# Patient Record
Sex: MALE | Race: BLACK OR AFRICAN AMERICAN | NOT HISPANIC OR LATINO | URBAN - METROPOLITAN AREA
[De-identification: names, ages, dates, MRNs, and addresses within clinical notes are randomized per-mention and may not be internally consistent; named-entity substitution may affect disease eponyms.]

---

## 2017-11-13 ENCOUNTER — INPATIENT (INPATIENT)
Facility: HOSPITAL | Age: 62
LOS: 2 days | Discharge: HOME | DRG: 191 | End: 2017-11-16
Attending: INTERNAL MEDICINE | Admitting: HOSPITALIST
Payer: COMMERCIAL

## 2017-11-13 VITALS
DIASTOLIC BLOOD PRESSURE: 115 MMHG | SYSTOLIC BLOOD PRESSURE: 163 MMHG | WEIGHT: 242.51 LBS | HEIGHT: 70 IN | OXYGEN SATURATION: 100 % | HEART RATE: 85 BPM

## 2017-11-13 DIAGNOSIS — R06.02 SHORTNESS OF BREATH: ICD-10-CM

## 2017-11-13 LAB
ALBUMIN SERPL ELPH-MCNC: 3.1 G/DL — LOW (ref 3.3–5)
ALP SERPL-CCNC: 206 U/L — HIGH (ref 40–120)
ALT FLD-CCNC: 49 U/L — SIGNIFICANT CHANGE UP (ref 12–78)
ANION GAP SERPL CALC-SCNC: 8 MMOL/L — SIGNIFICANT CHANGE UP (ref 5–17)
ANISOCYTOSIS BLD QL: SLIGHT — SIGNIFICANT CHANGE UP
APTT BLD: 27.4 SEC — LOW (ref 27.5–37.4)
AST SERPL-CCNC: 52 U/L — HIGH (ref 15–37)
BILIRUB SERPL-MCNC: 1 MG/DL — SIGNIFICANT CHANGE UP (ref 0.2–1.2)
BUN SERPL-MCNC: 21 MG/DL — SIGNIFICANT CHANGE UP (ref 7–23)
CALCIUM SERPL-MCNC: 7.9 MG/DL — LOW (ref 8.5–10.1)
CHLORIDE SERPL-SCNC: 104 MMOL/L — SIGNIFICANT CHANGE UP (ref 96–108)
CK MB BLD-MCNC: 0.3 % — SIGNIFICANT CHANGE UP (ref 0–3.5)
CK MB CFR SERPL CALC: 4.7 NG/ML — HIGH (ref 0–3.6)
CK SERPL-CCNC: 1583 U/L — HIGH (ref 26–308)
CO2 SERPL-SCNC: 30 MMOL/L — SIGNIFICANT CHANGE UP (ref 22–31)
CREAT SERPL-MCNC: 1.3 MG/DL — SIGNIFICANT CHANGE UP (ref 0.5–1.3)
D DIMER BLD IA.RAPID-MCNC: 382 NG/ML DDU — HIGH
ELLIPTOCYTES BLD QL SMEAR: SLIGHT — SIGNIFICANT CHANGE UP
EOSINOPHIL NFR BLD AUTO: 3 % — SIGNIFICANT CHANGE UP (ref 0–6)
GLUCOSE SERPL-MCNC: 110 MG/DL — HIGH (ref 70–99)
HCT VFR BLD CALC: 51.6 % — HIGH (ref 39–50)
HGB BLD-MCNC: 15.2 G/DL — SIGNIFICANT CHANGE UP (ref 13–17)
INR BLD: 1.17 RATIO — HIGH (ref 0.88–1.16)
LACTATE SERPL-SCNC: 1.3 MMOL/L — SIGNIFICANT CHANGE UP (ref 0.7–2)
LG PLATELETS BLD QL AUTO: SLIGHT — SIGNIFICANT CHANGE UP
LYMPHOCYTES # BLD AUTO: 29 % — SIGNIFICANT CHANGE UP (ref 13–44)
MCHC RBC-ENTMCNC: 22.6 PG — LOW (ref 27–34)
MCHC RBC-ENTMCNC: 29.5 GM/DL — LOW (ref 32–36)
MCV RBC AUTO: 76.6 FL — LOW (ref 80–100)
MONOCYTES NFR BLD AUTO: 10 % — HIGH (ref 1–9)
NEUTROPHILS NFR BLD AUTO: 54 % — SIGNIFICANT CHANGE UP (ref 43–77)
NEUTS BAND # BLD: 1 % — SIGNIFICANT CHANGE UP (ref 0–8)
NT-PROBNP SERPL-SCNC: 3858 PG/ML — HIGH (ref 0–125)
OVALOCYTES BLD QL SMEAR: SLIGHT — SIGNIFICANT CHANGE UP
PLAT MORPH BLD: NORMAL — SIGNIFICANT CHANGE UP
PLATELET # BLD AUTO: 160 K/UL — SIGNIFICANT CHANGE UP (ref 150–400)
POIKILOCYTOSIS BLD QL AUTO: SLIGHT — SIGNIFICANT CHANGE UP
POTASSIUM SERPL-MCNC: 3.3 MMOL/L — LOW (ref 3.5–5.3)
POTASSIUM SERPL-SCNC: 3.3 MMOL/L — LOW (ref 3.5–5.3)
PROT SERPL-MCNC: 7.3 G/DL — SIGNIFICANT CHANGE UP (ref 6–8.3)
PROTHROM AB SERPL-ACNC: 12.8 SEC — HIGH (ref 9.8–12.7)
RBC # BLD: 6.73 M/UL — HIGH (ref 4.2–5.8)
RBC # FLD: 15.4 % — HIGH (ref 10.3–14.5)
RBC BLD AUTO: ABNORMAL
SODIUM SERPL-SCNC: 142 MMOL/L — SIGNIFICANT CHANGE UP (ref 135–145)
TROPONIN I SERPL-MCNC: 0.12 NG/ML — HIGH (ref 0.01–0.04)
VARIANT LYMPHS # BLD: 3 % — SIGNIFICANT CHANGE UP (ref 0–6)
WBC # BLD: 7.3 K/UL — SIGNIFICANT CHANGE UP (ref 3.8–10.5)
WBC # FLD AUTO: 7.3 K/UL — SIGNIFICANT CHANGE UP (ref 3.8–10.5)

## 2017-11-13 PROCEDURE — 93010 ELECTROCARDIOGRAM REPORT: CPT

## 2017-11-13 PROCEDURE — 99285 EMERGENCY DEPT VISIT HI MDM: CPT

## 2017-11-13 PROCEDURE — 71020: CPT | Mod: 26

## 2017-11-13 PROCEDURE — 71275 CT ANGIOGRAPHY CHEST: CPT | Mod: 26

## 2017-11-13 RX ORDER — AZITHROMYCIN 500 MG/1
TABLET, FILM COATED ORAL
Qty: 0 | Refills: 0 | Status: DISCONTINUED | OUTPATIENT
Start: 2017-11-13 | End: 2017-11-14

## 2017-11-13 RX ORDER — AZITHROMYCIN 500 MG/1
500 TABLET, FILM COATED ORAL ONCE
Qty: 0 | Refills: 0 | Status: COMPLETED | OUTPATIENT
Start: 2017-11-13 | End: 2017-11-13

## 2017-11-13 RX ORDER — METOPROLOL TARTRATE 50 MG
25 TABLET ORAL ONCE
Qty: 0 | Refills: 0 | Status: COMPLETED | OUTPATIENT
Start: 2017-11-13 | End: 2017-11-13

## 2017-11-13 RX ORDER — BUDESONIDE, MICRONIZED 100 %
0.5 POWDER (GRAM) MISCELLANEOUS
Qty: 0 | Refills: 0 | Status: DISCONTINUED | OUTPATIENT
Start: 2017-11-13 | End: 2017-11-16

## 2017-11-13 RX ORDER — CLOPIDOGREL BISULFATE 75 MG/1
300 TABLET, FILM COATED ORAL ONCE
Qty: 0 | Refills: 0 | Status: COMPLETED | OUTPATIENT
Start: 2017-11-13 | End: 2017-11-13

## 2017-11-13 RX ORDER — ENOXAPARIN SODIUM 100 MG/ML
110 INJECTION SUBCUTANEOUS ONCE
Qty: 0 | Refills: 0 | Status: COMPLETED | OUTPATIENT
Start: 2017-11-13 | End: 2017-11-13

## 2017-11-13 RX ORDER — IPRATROPIUM/ALBUTEROL SULFATE 18-103MCG
3 AEROSOL WITH ADAPTER (GRAM) INHALATION EVERY 6 HOURS
Qty: 0 | Refills: 0 | Status: DISCONTINUED | OUTPATIENT
Start: 2017-11-13 | End: 2017-11-16

## 2017-11-13 RX ORDER — METOPROLOL TARTRATE 50 MG
2.5 TABLET ORAL EVERY 4 HOURS
Qty: 0 | Refills: 0 | Status: DISCONTINUED | OUTPATIENT
Start: 2017-11-13 | End: 2017-11-14

## 2017-11-13 RX ORDER — AZITHROMYCIN 500 MG/1
500 TABLET, FILM COATED ORAL EVERY 24 HOURS
Qty: 0 | Refills: 0 | Status: DISCONTINUED | OUTPATIENT
Start: 2017-11-14 | End: 2017-11-14

## 2017-11-13 RX ORDER — SODIUM CHLORIDE 9 MG/ML
1000 INJECTION INTRAMUSCULAR; INTRAVENOUS; SUBCUTANEOUS
Qty: 0 | Refills: 0 | Status: COMPLETED | OUTPATIENT
Start: 2017-11-13 | End: 2017-11-13

## 2017-11-13 RX ORDER — ISOSORBIDE DINITRATE 5 MG/1
10 TABLET ORAL THREE TIMES A DAY
Qty: 0 | Refills: 0 | Status: DISCONTINUED | OUTPATIENT
Start: 2017-11-13 | End: 2017-11-14

## 2017-11-13 RX ORDER — ASPIRIN/CALCIUM CARB/MAGNESIUM 324 MG
325 TABLET ORAL ONCE
Qty: 0 | Refills: 0 | Status: COMPLETED | OUTPATIENT
Start: 2017-11-13 | End: 2017-11-13

## 2017-11-13 RX ADMIN — SODIUM CHLORIDE 1000 MILLILITER(S): 9 INJECTION INTRAMUSCULAR; INTRAVENOUS; SUBCUTANEOUS at 23:45

## 2017-11-13 RX ADMIN — Medication 325 MILLIGRAM(S): at 21:32

## 2017-11-13 RX ADMIN — SODIUM CHLORIDE 1000 MILLILITER(S): 9 INJECTION INTRAMUSCULAR; INTRAVENOUS; SUBCUTANEOUS at 20:21

## 2017-11-13 RX ADMIN — CLOPIDOGREL BISULFATE 300 MILLIGRAM(S): 75 TABLET, FILM COATED ORAL at 21:32

## 2017-11-13 RX ADMIN — ENOXAPARIN SODIUM 110 MILLIGRAM(S): 100 INJECTION SUBCUTANEOUS at 23:46

## 2017-11-13 RX ADMIN — AZITHROMYCIN 255 MILLIGRAM(S): 500 TABLET, FILM COATED ORAL at 23:46

## 2017-11-13 RX ADMIN — Medication 25 MILLIGRAM(S): at 21:32

## 2017-11-13 NOTE — CONSULT NOTE ADULT - SUBJECTIVE AND OBJECTIVE BOX
Chart reviewed: Assessment plan is as below Note will be updated as more  patient data is gathered  Patient seen and examined today Plan discussed/Questions answered  (with patient/ancillary staff/colleagues) Chart notes reviewd More detailed Pulm/Critical Care notes, assessment and plan  will be added later today as needed after reviewing further labs as they become available     Notable interval events reviewed    ROS/PE  .     REVIEW OF SYMPTOMS    Able to give ROS  Yes     RELIABLE YES   Weakness Yes   Chills No   Vision changes No  Lymph nodes No enlarged glands   Endocrine No unexplained hair loss No het or cold intolerance   Allergy No hives   Sore throat No   Coughing blood No  Headache No  Confusion No  Chest pain No  Palpitations No   Pain abdomen NO   Shortness of breath YES  Vomiting NO  Pain neck No  Pain abdomen NO     PHYSICAL EXAM    HEENT Unremarkable PERRLA atraumatic  RESP Fair air entry EXP prolonged    Harsh breath sound Resp distres mild  CARDIAC S1 S2 No S3     NO JVD   ABDOMEN SOFT BS PRESENT NOT DISTENDED No hepatosplenomegaly  PEDAL EDEMA present No calf tenderness  NO rash GENERAL Not TOXIC looking  Awake A & O x 3   .     ERIC SANCHEZ [facility]    ALLERGY Amoxicillin cipro lovastat pencillin   CONTACT Auntie Vega [phone] self [phone]   REASON FOR VISIT   Initial evaluation/Pulmonary consultation requested 11/13/2017 by Dr Rory monterroso from Dr Nunn    Patient examined chart reviewed  HOSPITAL ADMISSION [facility] Dr Rory monterroso 11/13/2017    PATIENT CAME  FROM (if information available)    PAST MEDICAL & SURGICAL HISTORY:   Past Medical History:  Gout    High cholesterol    HTN (hypertension).     Tobacco Usage:  · Tobacco Usage Current every day smoker    ALLERGIES AND HOME MEDICATIONS:   Allergies:        Allergies:   lovastatin: Drug, Unknown   Cipro: Drug, Unknown   penicillin: Drug, Unknown   amoxicillin: Drug, Unknown  VITALS/LABS  11/13/2017 163/115 85 100% 110   11/13/2017 W 7.3 Hb 15.2 Plt 160  Na 142 K 3.3 CO2 30 Cr 1.3   11/13/2017 Ddimer 382   11/13/2017  AST 52 ALT 49   11/13/2017 CK 1583 Tr 1 .120   11/13/2017 CXR CM no infiltrates    PROBLEM ASSESSMENT PLAN                                                                                                 RESP DISTRESS  O2  RO VTE  11/13 Check V duplex legs  11/13 CTA already ordered   11/13/2017 Cl susp is mod to low   RO PNEUMONIA  11/13 RVP   11/13 bc uc   11/13 Azithro (11/13)  RO MI HYPERTENSIVE URGENCY  11/13/2017 CK 1583 Tr 1 .120   Isordil (11/13) Lopressor iv (11/13)  11/13 May need ntg drip and icu admission   COPD EX   BD (11/13)  RO CHF   ECHO 11/13                                                                                                                PATIENT DESCRIPTION HPI INITIAL PROBLEMS AND PLAN11/13/2017 ADMISSION [facility]                                  61 m PMH Htn HLD Gout current daily smoker all lovastatin cipro pnc amox PMH Parotiditis  sciatica bph sue erectile dysfn hld obesity   MEDS lisinopril 20 tamsulosin .4 atorvastat 10   Patient  had been having chest congestion and upper resp symptoms x last 3-4 d and in office was noted to have  D 104 and was sent to [facility] ER r hypertensive urgency ro poneumonia admitted 11/13/2017 Pt given clonidine .2 in [facility] office and given duoneb.2 transit                                                                                  HOSPITAL COURSE PLAN  11/13/2017 ADMISSION [facility]     Pt had elevated BP and elevated C enz suggesting hypertensive urgency  He will be ruled out for vte as DDimer was sl high and will be started on abio for poss rti      TIME SPENT Over 55 minutes aggregate  care time spent on encounter; activities included   direct patient care, counseling and/or coordinating care reviewing notes, lab data/ imaging , discussion with multidisciplinary team/ patient  /family. Risks, benefits, alternatives  discussed in detail. Proper antibiotic use issues addressed Questions/concerns  were addressed  as  best as possible

## 2017-11-13 NOTE — ED ADULT TRIAGE NOTE - CHIEF COMPLAINT QUOTE
cough w/ sore throat for 3 days, SOB today, given 0.1mg of clonidine at drDustin office for HTN and 2 duoneb treatments with EMS.

## 2017-11-13 NOTE — ED PROVIDER NOTE - OBJECTIVE STATEMENT
62 yo M p/w cough, congestion , dyspnea x past ~ 4 - 5 days. No known fever/chills. No chest pain. Pt seen by urgent care, sent to ed for HTN. NO HA/n/v/dizzy. No visual changes. No neck / back pain. no recent travel / immov. No recent sick contacts. No abd pain. No recent JUAREZ / easy fatigue. no agg/allev factors .

## 2017-11-13 NOTE — ED ADULT NURSE NOTE - OBJECTIVE STATEMENT
61 year old male presents to the ED with c/o cough and hypertension. A+O x 4. States he went to the doctor today for "flu-like s/s and was ashleigh his blood pressure was elevated and he should go to the hospital." Pt reports he has had a productive cough and nasal/ chest congestion for 4 days. afebrile. Denies headache, fever, syncope, LOC, CP, palpitations, abdominal pain, or back pain. Reports increased sob and worsening cough. Respirations even and unlabored on room air. Rhonchi heard bilaterally. + BS in all quadrants. Abdomen soft, non tender, and non distended. No swelling/ edema noted. Ambulates without incidence. Skin free of pallor/ cyanosis. S1/S2. Will continue to monitor.

## 2017-11-13 NOTE — ED PROVIDER NOTE - PROGRESS NOTE DETAILS
Dw Dr CHRISTIANO Nunn, pts PMD, states can use Hosp to admit, call SNEHA Bynum cardio. Jeremías Bynum, re elev CE. admit at [facility], he will see pt tonight. Jeremías Perez, will see pt to admit.

## 2017-11-13 NOTE — ED ADULT NURSE NOTE - CHPI ED SYMPTOMS NEG
no vomiting/no numbness/no decreased eating/drinking/no fever/no dizziness/no pain/no chills/no weakness/no nausea/no tingling

## 2017-11-14 DIAGNOSIS — M10.9 GOUT, UNSPECIFIED: ICD-10-CM

## 2017-11-14 DIAGNOSIS — E78.00 PURE HYPERCHOLESTEROLEMIA, UNSPECIFIED: ICD-10-CM

## 2017-11-14 DIAGNOSIS — Z29.9 ENCOUNTER FOR PROPHYLACTIC MEASURES, UNSPECIFIED: ICD-10-CM

## 2017-11-14 DIAGNOSIS — I10 ESSENTIAL (PRIMARY) HYPERTENSION: ICD-10-CM

## 2017-11-14 DIAGNOSIS — Z98.890 OTHER SPECIFIED POSTPROCEDURAL STATES: Chronic | ICD-10-CM

## 2017-11-14 DIAGNOSIS — R06.02 SHORTNESS OF BREATH: ICD-10-CM

## 2017-11-14 DIAGNOSIS — I16.0 HYPERTENSIVE URGENCY: ICD-10-CM

## 2017-11-14 LAB
ALBUMIN SERPL ELPH-MCNC: 2.6 G/DL — LOW (ref 3.3–5)
ALP SERPL-CCNC: 166 U/L — HIGH (ref 40–120)
ALT FLD-CCNC: 45 U/L — SIGNIFICANT CHANGE UP (ref 12–78)
ANION GAP SERPL CALC-SCNC: 9 MMOL/L — SIGNIFICANT CHANGE UP (ref 5–17)
APPEARANCE UR: CLEAR — SIGNIFICANT CHANGE UP
AST SERPL-CCNC: 49 U/L — HIGH (ref 15–37)
BACTERIA # UR AUTO: ABNORMAL
BASOPHILS # BLD AUTO: 0.3 K/UL — HIGH (ref 0–0.2)
BASOPHILS NFR BLD AUTO: 3.9 % — HIGH (ref 0–2)
BILIRUB DIRECT SERPL-MCNC: 0.2 MG/DL — SIGNIFICANT CHANGE UP (ref 0.05–0.2)
BILIRUB INDIRECT FLD-MCNC: 0.4 MG/DL — SIGNIFICANT CHANGE UP (ref 0.2–1)
BILIRUB SERPL-MCNC: 0.6 MG/DL — SIGNIFICANT CHANGE UP (ref 0.2–1.2)
BILIRUB UR-MCNC: NEGATIVE — SIGNIFICANT CHANGE UP
BUN SERPL-MCNC: 25 MG/DL — HIGH (ref 7–23)
CALCIUM SERPL-MCNC: 7.3 MG/DL — LOW (ref 8.5–10.1)
CHLORIDE SERPL-SCNC: 110 MMOL/L — HIGH (ref 96–108)
CK SERPL-CCNC: 1043 U/L — HIGH (ref 26–308)
CO2 SERPL-SCNC: 25 MMOL/L — SIGNIFICANT CHANGE UP (ref 22–31)
COLOR SPEC: YELLOW — SIGNIFICANT CHANGE UP
CREAT SERPL-MCNC: 1.2 MG/DL — SIGNIFICANT CHANGE UP (ref 0.5–1.3)
DIFF PNL FLD: ABNORMAL
EOSINOPHIL # BLD AUTO: 0.2 K/UL — SIGNIFICANT CHANGE UP (ref 0–0.5)
EOSINOPHIL NFR BLD AUTO: 1 % — SIGNIFICANT CHANGE UP (ref 0–6)
GLUCOSE SERPL-MCNC: 139 MG/DL — HIGH (ref 70–99)
GLUCOSE UR QL: NEGATIVE — SIGNIFICANT CHANGE UP
HCT VFR BLD CALC: 43.7 % — SIGNIFICANT CHANGE UP (ref 39–50)
HGB BLD-MCNC: 13.1 G/DL — SIGNIFICANT CHANGE UP (ref 13–17)
INR BLD: 1.26 RATIO — HIGH (ref 0.88–1.16)
KETONES UR-MCNC: NEGATIVE — SIGNIFICANT CHANGE UP
LEUKOCYTE ESTERASE UR-ACNC: NEGATIVE — SIGNIFICANT CHANGE UP
LYMPHOCYTES # BLD AUTO: 1.9 K/UL — SIGNIFICANT CHANGE UP (ref 1–3.3)
LYMPHOCYTES # BLD AUTO: 28 % — SIGNIFICANT CHANGE UP (ref 13–44)
MAGNESIUM SERPL-MCNC: 1.4 MG/DL — LOW (ref 1.6–2.6)
MCHC RBC-ENTMCNC: 22.9 PG — LOW (ref 27–34)
MCHC RBC-ENTMCNC: 30.1 GM/DL — LOW (ref 32–36)
MCV RBC AUTO: 76.1 FL — LOW (ref 80–100)
MONOCYTES # BLD AUTO: 0.9 K/UL — SIGNIFICANT CHANGE UP (ref 0–0.9)
MONOCYTES NFR BLD AUTO: 17 % — HIGH (ref 1–9)
NEUTROPHILS # BLD AUTO: 3.2 K/UL — SIGNIFICANT CHANGE UP (ref 1.8–7.4)
NEUTROPHILS NFR BLD AUTO: 49 % — SIGNIFICANT CHANGE UP (ref 43–77)
NITRITE UR-MCNC: NEGATIVE — SIGNIFICANT CHANGE UP
PH UR: 7 — SIGNIFICANT CHANGE UP (ref 5–8)
PHOSPHATE SERPL-MCNC: 3.5 MG/DL — SIGNIFICANT CHANGE UP (ref 2.5–4.5)
PLATELET # BLD AUTO: 142 K/UL — LOW (ref 150–400)
POTASSIUM SERPL-MCNC: 3.4 MMOL/L — LOW (ref 3.5–5.3)
POTASSIUM SERPL-SCNC: 3.4 MMOL/L — LOW (ref 3.5–5.3)
PROT SERPL-MCNC: 5.9 G/DL — LOW (ref 6–8.3)
PROT UR-MCNC: 150 MG/DL
PROTHROM AB SERPL-ACNC: 13.8 SEC — HIGH (ref 9.8–12.7)
RAPID RVP RESULT: DETECTED
RBC # BLD: 5.74 M/UL — SIGNIFICANT CHANGE UP (ref 4.2–5.8)
RBC # FLD: 15.2 % — HIGH (ref 10.3–14.5)
RBC CASTS # UR COMP ASSIST: ABNORMAL /HPF (ref 0–4)
RSV RNA SPEC QL NAA+PROBE: DETECTED
SODIUM SERPL-SCNC: 144 MMOL/L — SIGNIFICANT CHANGE UP (ref 135–145)
SP GR SPEC: 1 — LOW (ref 1.01–1.02)
TROPONIN I SERPL-MCNC: 0.11 NG/ML — HIGH (ref 0.01–0.04)
UROBILINOGEN FLD QL: 1
WBC # BLD: 6.4 K/UL — SIGNIFICANT CHANGE UP (ref 3.8–10.5)
WBC # FLD AUTO: 6.4 K/UL — SIGNIFICANT CHANGE UP (ref 3.8–10.5)
WBC UR QL: SIGNIFICANT CHANGE UP

## 2017-11-14 PROCEDURE — 99223 1ST HOSP IP/OBS HIGH 75: CPT | Mod: AI,GC

## 2017-11-14 PROCEDURE — 93970 EXTREMITY STUDY: CPT | Mod: 26

## 2017-11-14 PROCEDURE — 12345: CPT | Mod: NC

## 2017-11-14 RX ORDER — METOPROLOL TARTRATE 50 MG
25 TABLET ORAL
Qty: 0 | Refills: 0 | Status: DISCONTINUED | OUTPATIENT
Start: 2017-11-14 | End: 2017-11-16

## 2017-11-14 RX ORDER — ENOXAPARIN SODIUM 100 MG/ML
40 INJECTION SUBCUTANEOUS EVERY 24 HOURS
Qty: 0 | Refills: 0 | Status: DISCONTINUED | OUTPATIENT
Start: 2017-11-14 | End: 2017-11-16

## 2017-11-14 RX ORDER — TAMSULOSIN HYDROCHLORIDE 0.4 MG/1
1 CAPSULE ORAL
Qty: 0 | Refills: 0 | COMMUNITY

## 2017-11-14 RX ORDER — HYDRALAZINE HCL 50 MG
5 TABLET ORAL EVERY 4 HOURS
Qty: 0 | Refills: 0 | Status: DISCONTINUED | OUTPATIENT
Start: 2017-11-14 | End: 2017-11-16

## 2017-11-14 RX ORDER — DEXTROSE MONOHYDRATE, SODIUM CHLORIDE, AND POTASSIUM CHLORIDE 50; .745; 4.5 G/1000ML; G/1000ML; G/1000ML
1000 INJECTION, SOLUTION INTRAVENOUS
Qty: 0 | Refills: 0 | Status: DISCONTINUED | OUTPATIENT
Start: 2017-11-14 | End: 2017-11-16

## 2017-11-14 RX ORDER — POTASSIUM CHLORIDE 20 MEQ
40 PACKET (EA) ORAL ONCE
Qty: 0 | Refills: 0 | Status: COMPLETED | OUTPATIENT
Start: 2017-11-14 | End: 2017-11-14

## 2017-11-14 RX ORDER — LISINOPRIL 2.5 MG/1
20 TABLET ORAL DAILY
Qty: 0 | Refills: 0 | Status: DISCONTINUED | OUTPATIENT
Start: 2017-11-14 | End: 2017-11-14

## 2017-11-14 RX ORDER — ASPIRIN/CALCIUM CARB/MAGNESIUM 324 MG
81 TABLET ORAL DAILY
Qty: 0 | Refills: 0 | Status: DISCONTINUED | OUTPATIENT
Start: 2017-11-14 | End: 2017-11-16

## 2017-11-14 RX ORDER — LISINOPRIL 2.5 MG/1
40 TABLET ORAL DAILY
Qty: 0 | Refills: 0 | Status: DISCONTINUED | OUTPATIENT
Start: 2017-11-14 | End: 2017-11-16

## 2017-11-14 RX ORDER — CICLOPIROX OLAMINE 7.7 MG/G
0 CREAM TOPICAL
Qty: 0 | Refills: 0 | COMMUNITY

## 2017-11-14 RX ORDER — AZITHROMYCIN 500 MG/1
500 TABLET, FILM COATED ORAL EVERY 24 HOURS
Qty: 0 | Refills: 0 | Status: DISCONTINUED | OUTPATIENT
Start: 2017-11-14 | End: 2017-11-16

## 2017-11-14 RX ORDER — POTASSIUM CHLORIDE 20 MEQ
40 PACKET (EA) ORAL ONCE
Qty: 0 | Refills: 0 | Status: DISCONTINUED | OUTPATIENT
Start: 2017-11-14 | End: 2017-11-14

## 2017-11-14 RX ORDER — LANOLIN ALCOHOL/MO/W.PET/CERES
3 CREAM (GRAM) TOPICAL ONCE
Qty: 0 | Refills: 0 | Status: COMPLETED | OUTPATIENT
Start: 2017-11-14 | End: 2017-11-15

## 2017-11-14 RX ORDER — COLCHICINE 0.6 MG
1 TABLET ORAL
Qty: 0 | Refills: 0 | COMMUNITY

## 2017-11-14 RX ORDER — AMLODIPINE BESYLATE 2.5 MG/1
10 TABLET ORAL DAILY
Qty: 0 | Refills: 0 | Status: DISCONTINUED | OUTPATIENT
Start: 2017-11-14 | End: 2017-11-16

## 2017-11-14 RX ORDER — MAGNESIUM SULFATE 500 MG/ML
1 VIAL (ML) INJECTION ONCE
Qty: 0 | Refills: 0 | Status: COMPLETED | OUTPATIENT
Start: 2017-11-14 | End: 2017-11-14

## 2017-11-14 RX ORDER — COLCHICINE 0.6 MG
0.6 TABLET ORAL DAILY
Qty: 0 | Refills: 0 | Status: DISCONTINUED | OUTPATIENT
Start: 2017-11-14 | End: 2017-11-16

## 2017-11-14 RX ORDER — AMLODIPINE BESYLATE 2.5 MG/1
10 TABLET ORAL ONCE
Qty: 0 | Refills: 0 | Status: COMPLETED | OUTPATIENT
Start: 2017-11-14 | End: 2017-11-14

## 2017-11-14 RX ORDER — ATORVASTATIN CALCIUM 80 MG/1
10 TABLET, FILM COATED ORAL AT BEDTIME
Qty: 0 | Refills: 0 | Status: DISCONTINUED | OUTPATIENT
Start: 2017-11-14 | End: 2017-11-16

## 2017-11-14 RX ORDER — LISINOPRIL 2.5 MG/1
0 TABLET ORAL
Qty: 0 | Refills: 0 | COMMUNITY

## 2017-11-14 RX ADMIN — Medication 100 GRAM(S): at 18:01

## 2017-11-14 RX ADMIN — Medication 81 MILLIGRAM(S): at 12:47

## 2017-11-14 RX ADMIN — AZITHROMYCIN 500 MILLIGRAM(S): 500 TABLET, FILM COATED ORAL at 22:09

## 2017-11-14 RX ADMIN — ISOSORBIDE DINITRATE 10 MILLIGRAM(S): 5 TABLET ORAL at 06:24

## 2017-11-14 RX ADMIN — Medication 0.6 MILLIGRAM(S): at 12:47

## 2017-11-14 RX ADMIN — Medication 2.5 MILLIGRAM(S): at 02:25

## 2017-11-14 RX ADMIN — Medication 3 MILLILITER(S): at 07:36

## 2017-11-14 RX ADMIN — LISINOPRIL 40 MILLIGRAM(S): 2.5 TABLET ORAL at 12:47

## 2017-11-14 RX ADMIN — Medication 2.5 MILLIGRAM(S): at 06:25

## 2017-11-14 RX ADMIN — Medication 3 MILLILITER(S): at 13:58

## 2017-11-14 RX ADMIN — DEXTROSE MONOHYDRATE, SODIUM CHLORIDE, AND POTASSIUM CHLORIDE 50 MILLILITER(S): 50; .745; 4.5 INJECTION, SOLUTION INTRAVENOUS at 19:17

## 2017-11-14 RX ADMIN — Medication 3 MILLILITER(S): at 20:18

## 2017-11-14 RX ADMIN — LISINOPRIL 20 MILLIGRAM(S): 2.5 TABLET ORAL at 06:24

## 2017-11-14 RX ADMIN — Medication 0.5 MILLIGRAM(S): at 07:35

## 2017-11-14 RX ADMIN — ATORVASTATIN CALCIUM 10 MILLIGRAM(S): 80 TABLET, FILM COATED ORAL at 22:09

## 2017-11-14 RX ADMIN — ENOXAPARIN SODIUM 40 MILLIGRAM(S): 100 INJECTION SUBCUTANEOUS at 22:08

## 2017-11-14 RX ADMIN — Medication 25 MILLIGRAM(S): at 18:01

## 2017-11-14 RX ADMIN — Medication 3 MILLILITER(S): at 01:40

## 2017-11-14 RX ADMIN — Medication 40 MILLIEQUIVALENT(S): at 02:25

## 2017-11-14 RX ADMIN — Medication 0.5 MILLIGRAM(S): at 20:18

## 2017-11-14 RX ADMIN — AMLODIPINE BESYLATE 10 MILLIGRAM(S): 2.5 TABLET ORAL at 22:09

## 2017-11-14 NOTE — PROGRESS NOTE ADULT - SUBJECTIVE AND OBJECTIVE BOX
Patient seen and examined today Plan discussed/Questions answered  (with patient/ancillary staff/colleagues) Chart notes reviewd More detailed Pulm/Critical Care notes, assessment and plan  will be added later today as needed after reviewing further labs as they become available     Notable interval events reviewed    ROS/PE  . REVIEW OF SYMPTOMS    Able to give ROS  Yes     RELIABLE YES   Weakness Yes   Chills No   Vision changes No  Lymph nodes No enlarged glands   Endocrine No unexplained hair loss No het or cold intolerance   Allergy No hives   Sore throat No   Coughing blood No  Headache No  Confusion No  Chest pain No  Palpitations No   Pain abdomen NO   Shortness of breath YES  Vomiting NO  Pain neck No  Pain abdomen NO     PHYSICAL EXAM    HEENT Unremarkable PERRLA atraumatic  RESP Fair air entry EXP prolonged    Harsh breath sound Resp distres mild  CARDIAC S1 S2 No S3     NO JVD   ABDOMEN SOFT BS PRESENT NOT DISTENDED No hepatosplenomegaly  PEDAL EDEMA present No calf tenderness  NO rash GENERAL Not TOXIC looking  Awake A & O x 3   . BRIEF ASSESSMENT PLAN   hypertensive with elevated cardiac enzymes and resp tract rsv infection on supportive Rx and antihypertensives Befing followed by cardio for positive enzymes  For dc planning once bp controlled and cleared by cardio Patient seen and examined today Plan discussed/Questions answered  (with patient/ancillary staff/colleagues) Chart notes reviewd More detailed Pulm/Critical Care notes, assessment and plan  will be added later today as needed after reviewing further labs as they become available     Notable interval events reviewed    ROS/PE  . REVIEW OF SYMPTOMS    Able to give ROS  Yes     RELIABLE YES   Weakness Yes   Chills No   Vision changes No  Lymph nodes No enlarged glands   Endocrine No unexplained hair loss No het or cold intolerance   Allergy No hives   Sore throat No   Coughing blood No  Headache No  Confusion No  Chest pain No  Palpitations No   Pain abdomen NO   Shortness of breath YES  Vomiting NO  Pain neck No  Pain abdomen NO     PHYSICAL EXAM    HEENT Unremarkable PERRLA atraumatic  RESP Fair air entry EXP prolonged    Harsh breath sound Resp distres mild  CARDIAC S1 S2 No S3     NO JVD   ABDOMEN SOFT BS PRESENT NOT DISTENDED No hepatosplenomegaly  PEDAL EDEMA present No calf tenderness  NO rash GENERAL Not TOXIC looking  Awake A & O x 3   . BRIEF ASSESSMENT PLAN   hypertensive with elevated cardiac enzymes and resp tract rsv infection on supportive Rx and antihypertensives Befing followed by cardio for positive enzymes  For dc planning once bp controlled and cleared by cardio    PAST MEDICAL & SURGICAL HISTORY:   Past Medical History:  Gout    High cholesterol    HTN (hypertension).     Tobacco Usage:  · Tobacco Usage Current every day smoker    ALLERGIES AND HOME MEDICATIONS:   Allergies:        Allergies:   lovastatin: Drug, Unknown   Cipro: Drug, Unknown   penicillin: Drug, Unknown   amoxicillin: Drug, Unknown  VITALS/LABS  11/14/2017 afeb 84 145/98 18 99%   11/14/2017 D5 1/2 NS 20 K 50 (11/14)   11/14/2017 W 6.4 Hb 13.1 Plt 142  Na 144 K 3.4 CO2 25 Cr 1.2 G 139  11/14 Mg 1.4 (d)   PATIENT DESCRIPTION HPI INITIAL PROBLEMS AND PLAN11/13/2017 ADMISSION [facility]                                  61 m PMH Htn HLD Gout current daily smoker all lovastatin cipro pnc amox PMH Parotiditis  sciatica bph sue erectile dysfn hld obesity   MEDS lisinopril 20 tamsulosin .4 atorvastat 10   Patient  had been having chest congestion and upper resp symptoms x last 3-4 d and in office was noted to have  D 104 and was sent to [facility] ER r hypertensive urgency ro poneumonia admitted 11/13/2017 Pt given clonidine .2 in [facility] office and given duoneb.2 transit                                                                                  HOSPITAL COURSE PLAN  11/13/2017 ADMISSION [facility]     Pt had elevated BP and elevated C enz suggesting hypertensive urgency  Hyoertension was controlled using ACEI Hydralazine and BB He was ruled out for vte as DDimer was sl high on arrival and was found to have RSV RTI and was Rxed with supp care plus azithro for poss superimposed bact infection Alk Phos was elevated and reich was started      PROBLEM ASSESSMENT PLAN                                                                                                 RESP DISTRESS  Managed with suppl O2   VTE   11/13 Ruled out Neg CTA and Neg V Duplex   11/13/2017 Cl susp was  mod to low   RO PNEUMONIA  11/13 RVP showed RSV  On 11/13 pct  was .12   Rxed with Azithro (11/13) for poss superimposed bact infectn   COPD   Managed with IBD ICS   RO MI  Initial C enz were positive   11/13-11/14/2017 CK 8109-5590 Tr 1 .120 (i) - .108 (i)  FD lvnx started 11/13 was changed to prophylactic dose 11/14 by PMD   Managed with nitrates statins BB acei   On  11/14 Cardio Dr FLOWER felt that C enz do not meet criteria for NSTEMI  HYPERTENSIVE URGENCY  Managed with Hydralazine 5 v.6p (11/14)  Lisinopril 40 (11/14) Metoprolol 25.2 (11/14)   RO CHF   ECHO  ordered on 11/13   ELEVATED ALK PHOS   Check US ruq and GGT and ac hep panel (ordered 11/14)   ADRENAL MASS   CTACh 11/13  3.1 cm R adr mass with low attenuation cw adenoma To be FU as outpt  TIME SPENT Over 25 minutes aggregate critical care time spent on encounter; activities included   direct patient care, counseling and/or coordinating care reviewing notes, lab data/ imaging , discussion with multidisciplinary team/ patient  /family. Risks, benefits, alternatives  discussed in detail. Questions/concerns  were addressed  to the best of my ability .

## 2017-11-14 NOTE — H&P ADULT - PROBLEM SELECTOR PLAN 5
- dvt ppx with full dose lovenox   - PO diet - dvt ppx with lovenox   - PO diet - continue colchicine

## 2017-11-14 NOTE — H&P ADULT - NSHPREVIEWOFSYSTEMS_GEN_ALL_CORE
Constitutional: denies fever, chills, diaphoresis   HEENT: denies blurry vision, difficulty hearing  Respiratory: +SOB, +JUAREZ, +cough, +sputum production, +wheezing, denies hemoptysis  Cardiovascular: denies CP, palpitations, +edema  Gastrointestinal: denies nausea, vomiting, diarrhea, constipation, abdominal pain, melena, hematochezia   Genitourinary: denies dysuria, frequency, urgency, hematuria   Musculoskeletal: denies myalgias, joint swelling, muscle weakness  Neurologic: denies headache, weakness, dizziness, paresthesias, numbness/tingling  ROS negative except as noted above

## 2017-11-14 NOTE — H&P ADULT - ASSESSMENT
Patient is a 60 yo male with pmhx of HTN, HLD, Gout presenting with shortness of breath, admit for respiratory distress, r/o CHF exacerbation vs copd vs PE Patient is a 62 yo male with pmhx of HTN, HLD, Gout presenting with shortness of breath, admit for respiratory distress, evaluate for CHF exacerbation vs copd vs PE, hypertensive urgency evaluate for ACS.

## 2017-11-14 NOTE — PROGRESS NOTE ADULT - ATTENDING COMMENTS
Patient is a 60 yo male with pmhx of HTN, HLD, Gout presenting with shortness of breath, admitted for respiratory distress,  2/2 elevated troponins with elevated BP sec to  Hypertensive Urgency cont bp meds , high bnp  xray cardiomegaly but no congestion  and clinical examination euvolemia will fu echo result to r/o  cardiomyopathy cont asa   , hx leg bl swelling pt does used to take lasix  but state no pmh hx chf . hypokalemia replaced - fu electrolyte .  Right-sided adrenal adenoma measuring 3.1 cm./ fu serum acth than out pt fu.  from 11-15-17 pt will be on Dr carlson service care  .

## 2017-11-14 NOTE — H&P ADULT - NSHPPHYSICALEXAM_GEN_ALL_CORE
Physical Exam:  General: Well developed, well nourished, NAD  HEENT: NCAT, PERRLA, EOMI bl, moist mucous membranes   Neck: Supple, nontender, no mass  Neurology: A&Ox3, nonfocal  Respiratory: diffused wheezing b/l, +crackles b/l   CV: RRR, +S1/S2, no murmurs, rubs or gallops  Abdominal: Soft, NT, ND +BSx4  Extremities: +1 b/l LE edema  Skin: warm, dry

## 2017-11-14 NOTE — H&P ADULT - PROBLEM SELECTOR PLAN 1
- r/o CHF vs COPD vs PE  - continue full dose lovenox, aspirin 81  - f/u CTA result  - f/u LE doppler result  - trend CE  - f/u cardiac echo  - strict I&Os, daily weight  - continue dusamy pulmicort  - Pulm Consult Dr. Nunn  - Cardio consult [facility] - r/o CHF vs COPD vs PE  - continue aspirin 81  - continue zithromax  - f/u CTA result  - f/u LE doppler result  - trend CE  - f/u cardiac echo  - strict I&Os, daily weight  - continue duoneb, pulmicort  - Pulm Consult Dr. Nunn  - Cardio consult [facility] - evaluate for  new onset CHF vs COPD exacerbation vs PE  - continue aspirin 81  - continue zithromax  - f/u CTA result  - f/u LE doppler result  - trend CE  - f/u cardiac echo  - strict I&Os, daily weight  - continue duoneb, pulmicort  - Pulm Consult Dr. Nunn  - Cardio consult [facility]

## 2017-11-14 NOTE — H&P ADULT - PROBLEM SELECTOR PLAN 3
- continue atorvastatin monitor BP  ICU was consulted  evaluate for ACS, trend troponins monitor BP  improved  evaluate for ACS, trend troponins

## 2017-11-14 NOTE — CONSULT NOTE ADULT - SUBJECTIVE AND OBJECTIVE BOX
[facility]     Patient is a 61y old  Male who presents with a chief complaint of     HPI:  Patient is a 62 yo male with pmhx of HTN, HLD, Gout presenting with shortness of breath. Patient stated that he has cold like symptoms with chest congestion and cough for about 3-4 days, went to his PMD today for evaluation and was told that he has elevated blood pressure and was sent to the ED. Patient also complain of SOB for over 1 year which worsened in the past few days. Patient complains of JUAREZ, whenever he walks long distance or up stairs. Stated that he sleeps on about 5 pillows every night. Patient also admits to occasional b/l leg swelling. Stated that his chest pain started about 3-4 days ago and worsens whenever he coughs. Denies any sick contact, stated that he received his flu shot about 2 months ago. Patient denies any hx of CHF, or heart disease. Denies fever, chills, palpitations, abdominal pain, nausea, vomiting, diarrhea, constipation, urinary frequency, urgency, or dysuria, headaches, changes in vision, dizziness, numbness, or tingling.    In the ED, is afebrile, 75bpm, 182/108, RR16 @97% room air. WBC 7.3, h/h 15.2/51.6. K 3.3, BUN 21, Cr 1.3. Lactate 1.3, Ddimer 382, alk phos 206, AST 52, CK 1583, CKMB 4.7, Troponin 0.12, ProBNP 3858. CXR was negative for infiltrates or effusion. EKG showed sinus rhythm, RBBB, left anterior fascicular block, bifascular block, left ventricular hypertrophy. (2017 00:05)      HPI:    61y Male for Cardiology Consult    PAST MEDICAL & SURGICAL HISTORY:  High cholesterol  HTN (hypertension)  Gout  H/O elbow surgery  History of hip surgery      FAMILY HISTORY:  Family history of heart disease (Father)      SOCIAL HISTORY:   Alcohol:  Smoking:    Allergies    amoxicillin (Unknown)  Cipro (Unknown)  lovastatin (Unknown)  penicillin (Unknown)    Intolerances        MEDICATIONS  (STANDING):  ALBUTerol/ipratropium for Nebulization 3 milliLiter(s) Nebulizer every 6 hours  aspirin  chewable 81 milliGRAM(s) Oral daily  atorvastatin 10 milliGRAM(s) Oral at bedtime  azithromycin  IVPB      azithromycin  IVPB 500 milliGRAM(s) IV Intermittent every 24 hours  buDESOnide   0.5 milliGRAM(s) Respule 0.5 milliGRAM(s) Inhalation two times a day  colchicine 0.6 milliGRAM(s) Oral daily  enoxaparin Injectable 40 milliGRAM(s) SubCutaneous every 24 hours  guaiFENesin/dextromethorphan  Syrup 10 milliLiter(s) Oral four times a day  isosorbide   dinitrate Tablet (ISORDIL) 10 milliGRAM(s) Oral three times a day  lisinopril 40 milliGRAM(s) Oral daily    MEDICATIONS  (PRN):      REVIEW OF SYSTEMS:  CONSTITUTIONAL: No fever, weight loss, chills, shakes, or fat  RESPIRATORY: No cough, wheezing, hemoptysis, or shortness of breath  CARDIOVASCULAR: No chest pain, dyspnea, palpitations, dizziness, syncope, paroxysmal nocturnal dyspnea, orthopnea, or arm or leg swelling  GASTROINTESTINAL: No abdominal  or epigastric pain, nausea, vomiting, hematemesis, diarrhea, constipation, melena or bright red bloo  NEUROLOGICAL: No headaches, memory loss, slurred speech, limb weakness, loss of strength, numbness, or tremors  SKIN: No itching, burning, rashes, or lesions  ENDOCRINE: No heat or cold intolerance, or hair loss  MUSCULOSKELETAL: No joint pain or swelling, muscle, back, or extremity pain  HEME/LYMPH: No easy bruising or bleeding gums  ALLERY AND IMMUNOLOGIC: No hives or rash.    Vital Signs Last 24 Hrs  T(C): 36.5 (2017 05:20), Max: 36.7 (2017 21:18)  T(F): 97.7 (2017 05:20), Max: 98.1 (2017 21:18)  HR: 77 (2017 05:20) (75 - 85)  BP: 146/103 (2017 05:20) (138/73 - 182/108)  BP(mean): --  RR: 19 (2017 05:20) (16 - 20)  SpO2: 98% (2017 05:20) (97% - 100%)    PHYSICAL EXAM:  HEAD:  Atraumatic, Normocephalic  EYES: EOMI, PERRLA, conjunctiva and sclera clear  NECK: Supple and normal thyroid.  No JVD or carotid bruit.   HEART: S1, S2 regular , 1/6 soft ejection systolic murmur in mitral area , no thrill and no gallops .  PULMONARY: Bilateral vesicular breathing , few scattered ronchi and few scattered rales are present .  ABDOMEN: Soft nontender and positive bowl sounds   EXTREMITIES:  No clubbing, cyanosis, or pedal  edema  NEUROLOGICAL: AAOX3 , no focal deficit .    LABS:                        15.2   7.3   )-----------( 160      ( 2017 20:16 )             51.6         142  |  104  |  21  ----------------------------<  110<H>  3.3<L>   |  30  |  1.30    Ca    7.9<L>      2017 20:15    TPro  7.3  /  Alb  3.1<L>  /  TBili  1.0  /  DBili  x   /  AST  52<H>  /  ALT  49  /  AlkPhos  206<H>      CARDIAC MARKERS ( 2017 20:15 )  .120 ng/mL / x     / 1583 U/L / x     / 4.7 ng/mL      PT/INR - ( 2017 20:53 )   PT: 12.8 sec;   INR: 1.17 ratio         PTT - ( 2017 20:53 )  PTT:27.4 sec  Urinalysis Basic - ( 2017 01:24 )    Color: Yellow / Appearance: Clear / S.005 / pH: x  Gluc: x / Ketone: Negative  / Bili: Negative / Urobili: 1   Blood: x / Protein: 150 mg/dL / Nitrite: Negative   Leuk Esterase: Negative / RBC: 3-5 /HPF / WBC 0-2   Sq Epi: x / Non Sq Epi: x / Bacteria: Occasional      BNPSerum Pro-Brain Natriuretic Peptide: 3858 pg/mL ( @ 20:15)        EKG:  ECHO:  IMAGING:    Assessment and Plan :   FULL CONSULT DICTATED .  Patient has SOB , cough , wheezing and has acute bronchitis and also elevated Troponin I which do not meet criteria of NON -STEMI .  Will continue to follow during hospital course and give further recommendations as needed . Thanks for your referral . if any questions please contact me at office [phone])

## 2017-11-14 NOTE — H&P ADULT - HISTORY OF PRESENT ILLNESS
Patient is a 60 yo male with pmhx of HTN, HLD, Gout presenting with shortness of breath. Patient stated that he has cold like symptoms with chest congestion and cough for about 3-4 days, went to his PMD today for evaluation and was told that he has elevated blood pressure and was sent to the ED. Patient also complain of SOB for over 1 year which worsened in the past few days. Patient complains of JUAREZ, whenever he walks long distance or up stairs. Stated that he sleeps on about 5 pillows every night. Patient also admits to occasional b/l leg swelling. Stated that his chest pain started about 3-4 days ago and worsens whenever he coughs. Denies any sick contact, stated that he received his flu shot about 2 months ago. Patient denies any hx of CHF, or heart disease. Denies fever, chills, palpitations, abdominal pain, nausea, vomiting, diarrhea, constipation, urinary frequency, urgency, or dysuria, headaches, changes in vision, dizziness, numbness, or tingling.    In the ED, is afebrile, 75bpm, 182/108, RR16 @97% room air. WBC 7.3, h/h 15.2/51.6. K 3.3, BUN 21, Cr 1.3. Lactate 1.3, Ddimer 382, alk phos 206, AST 52, CK 1583, CKMB 4.7, Troponin 0.12, ProBNP 3858. CXR was negative for infiltrates or effusion. EKG showed sinus rhythm, RBBB, left anterior fascicular block, bifascular block, left ventricular hypertrophy.

## 2017-11-14 NOTE — PROGRESS NOTE ADULT - SUBJECTIVE AND OBJECTIVE BOX
Patient is a 61y old  Male who presents with a chief complaint of     HPI:  Patient is a 60 yo male with pmhx of HTN, HLD, Gout presenting with shortness of breath. Patient stated that he has cold like symptoms with chest congestion and cough for about 3-4 days, went to his PMD today for evaluation and was told that he has elevated blood pressure and was sent to the ED. Patient also complain of SOB for over 1 year which worsened in the past few days. Patient complains of JUAREZ, whenever he walks long distance or up stairs. Stated that he sleeps on about 5 pillows every night. Patient also admits to occasional b/l leg swelling. Stated that his chest pain started about 3-4 days ago and worsens whenever he coughs. Denies any sick contact, stated that he received his flu shot about 2 months ago. Patient denies any hx of CHF, or heart disease. Denies fever, chills, palpitations, abdominal pain, nausea, vomiting, diarrhea, constipation, urinary frequency, urgency, or dysuria, headaches, changes in vision, dizziness, numbness, or tingling.     admitted with  sob , high troponin  / htn urgency  found to be rvp positive - hosp course pt seen and examine today , pt state feel sob on exertion , no cp , no fever , no resp distress.    INTERVAL HPI/OVERNIGHT EVENTS:  T(C): 36.2 (17 @ 13:39), Max: 36.7 (17 @ 21:18)  HR: 84 (17 @ 13:39) (75 - 85)  BP: 145/98 (17 @ 13:39) (138/73 - 182/108)  RR: 18 (17 @ 13:39) (16 - 20)  SpO2: 99% (17 @ 13:39) (97% - 100%)  Wt(kg): --  I&O's Summary      REVIEW OF SYSTEMS:  CONSTITUTIONAL: No fever, weight loss, or fatigue  EYES: No eye pain, visual disturbances, or discharge  ENMT:  No difficulty hearing, tinnitus,   NECK: No pain or stiffness  RESPIRATORY: No cough, wheezing, chills or hemoptysis;  yes  exertion shortness of breath  CARDIOVASCULAR: No chest pain, palpitations, dizziness, yes leg swelling  GASTROINTESTINAL: No abdominal or epigastric pain. No nausea, vomiting, or hematemesis; No diarrhea or constipation.   GENITOURINARY: No dysuria, frequency, hematuria, or incontinence  NEUROLOGICAL: No headaches, memory loss, loss of strength  SKIN: No itching, burning, rashes, or lesions     MUSCULOSKELETAL: No joint pain or swelling; No muscle, back, or extremity pain      PHYSICAL EXAM:  GENERAL: NAD, well-groomed, well-developed  HEAD:  Atraumatic, Normocephalic  EYES: EOMI, PERRLA, conjunctiva and sclera clear  ENMT:  Moist mucous membranes, No lesions  NECK: Supple, No JVD,   NERVOUS SYSTEM:  Alert & Oriented X3, Good concentration; Motor Strength 5/5 B/L upper and lower extremities; DTRs 2+ intact and symmetric  CHEST/LUNG: Coarse bilaterally; No rales  wheezing,   HEART: Regular rate and rhythm; No murmurs, no tachy   ABDOMEN: Soft, Nontender, Nondistended; Bowel sounds present  EXTREMITIES:  2+ Peripheral Pulses, No clubbing, cyanosis, bl leg  1+ pitting  edema   SKIN: No rashes or lesions    MEDICATIONS  (STANDING):  ALBUTerol/ipratropium for Nebulization 3 milliLiter(s) Nebulizer every 6 hours  aspirin  chewable 81 milliGRAM(s) Oral daily  atorvastatin 10 milliGRAM(s) Oral at bedtime  azithromycin   Tablet 500 milliGRAM(s) Oral every 24 hours  buDESOnide   0.5 milliGRAM(s) Respule 0.5 milliGRAM(s) Inhalation two times a day  colchicine 0.6 milliGRAM(s) Oral daily  dextrose 5% + sodium chloride 0.45% with potassium chloride 20 mEq/L 1000 milliLiter(s) (50 mL/Hr) IV Continuous <Continuous>  enoxaparin Injectable 40 milliGRAM(s) SubCutaneous every 24 hours  guaiFENesin/dextromethorphan  Syrup 10 milliLiter(s) Oral four times a day  lisinopril 40 milliGRAM(s) Oral daily  metoprolol     tartrate 25 milliGRAM(s) Oral two times a day    MEDICATIONS  (PRN):  hydrALAZINE Injectable 5 milliGRAM(s) IV Push every 4 hours PRN for systolic BP>160 or diastolic BP >100 and call dr Bynum [phone]      LABS:                        13.1   6.4   )-----------( 142      ( 2017 09:29 )             43.7     -    144  |  110<H>  |  25<H>  ----------------------------<  139<H>  3.4<L>   |  25  |  1.20    Ca    7.3<L>      2017 09:29  Phos  3.5       Mg     1.4         TPro  5.9<L>  /  Alb  2.6<L>  /  TBili  0.6  /  DBili  .20  /  AST  49<H>  /  ALT  45  /  AlkPhos  166<H>      PT/INR - ( 2017 09:29 )   PT: 13.8 sec;   INR: 1.26 ratio         PTT - ( 2017 20:53 )  PTT:27.4 sec  Urinalysis Basic - ( 2017 01:24 )    Color: Yellow / Appearance: Clear / S.005 / pH: x  Gluc: x / Ketone: Negative  / Bili: Negative / Urobili: 1   Blood: x / Protein: 150 mg/dL / Nitrite: Negative   Leuk Esterase: Negative / RBC: 3-5 /HPF / WBC 0-2   Sq Epi: x / Non Sq Epi: x / Bacteria: Occasional      CAPILLARY BLOOD GLUCOSE                  RADIOLOGY & ADDITIONAL TESTS:    Imaging Personally Reviewed:     fu echo result , leg doppler -  IMPRESSION:     No evidence of bilateral lower extremity deep venous thrombosis.  Advance Directives:    full code

## 2017-11-15 DIAGNOSIS — M10.9 GOUT, UNSPECIFIED: ICD-10-CM

## 2017-11-15 DIAGNOSIS — M62.82 RHABDOMYOLYSIS: ICD-10-CM

## 2017-11-15 DIAGNOSIS — R74.8 ABNORMAL LEVELS OF OTHER SERUM ENZYMES: ICD-10-CM

## 2017-11-15 DIAGNOSIS — B34.9 VIRAL INFECTION, UNSPECIFIED: ICD-10-CM

## 2017-11-15 LAB
ACTH SER-ACNC: 22 PG/ML — SIGNIFICANT CHANGE UP (ref 0–46)
ANION GAP SERPL CALC-SCNC: 10 MMOL/L — SIGNIFICANT CHANGE UP (ref 5–17)
BASOPHILS # BLD AUTO: 0.1 K/UL — SIGNIFICANT CHANGE UP (ref 0–0.2)
BASOPHILS NFR BLD AUTO: 1.1 % — SIGNIFICANT CHANGE UP (ref 0–2)
BUN SERPL-MCNC: 22 MG/DL — SIGNIFICANT CHANGE UP (ref 7–23)
CALCIUM SERPL-MCNC: 8.3 MG/DL — LOW (ref 8.5–10.1)
CHLORIDE SERPL-SCNC: 109 MMOL/L — HIGH (ref 96–108)
CHOLEST SERPL-MCNC: 144 MG/DL — SIGNIFICANT CHANGE UP (ref 10–199)
CK SERPL-CCNC: 987 U/L — HIGH (ref 26–308)
CO2 SERPL-SCNC: 26 MMOL/L — SIGNIFICANT CHANGE UP (ref 22–31)
CREAT SERPL-MCNC: 1 MG/DL — SIGNIFICANT CHANGE UP (ref 0.5–1.3)
EOSINOPHIL # BLD AUTO: 0.2 K/UL — SIGNIFICANT CHANGE UP (ref 0–0.5)
EOSINOPHIL NFR BLD AUTO: 2.3 % — SIGNIFICANT CHANGE UP (ref 0–6)
GGT SERPL-CCNC: 86 U/L — HIGH (ref 9–50)
GLUCOSE SERPL-MCNC: 94 MG/DL — SIGNIFICANT CHANGE UP (ref 70–99)
HAV IGM SER-ACNC: SIGNIFICANT CHANGE UP
HBV CORE IGM SER-ACNC: SIGNIFICANT CHANGE UP
HBV SURFACE AG SER-ACNC: SIGNIFICANT CHANGE UP
HCT VFR BLD CALC: 44.9 % — SIGNIFICANT CHANGE UP (ref 39–50)
HCV AB S/CO SERPL IA: 0.12 S/CO — SIGNIFICANT CHANGE UP
HCV AB SERPL-IMP: SIGNIFICANT CHANGE UP
HDLC SERPL-MCNC: 31 MG/DL — LOW (ref 40–125)
HGB BLD-MCNC: 13.5 G/DL — SIGNIFICANT CHANGE UP (ref 13–17)
LEGIONELLA AG UR QL: NEGATIVE — SIGNIFICANT CHANGE UP
LIPID PNL WITH DIRECT LDL SERPL: 91 MG/DL — SIGNIFICANT CHANGE UP
LYMPHOCYTES # BLD AUTO: 2.7 K/UL — SIGNIFICANT CHANGE UP (ref 1–3.3)
LYMPHOCYTES # BLD AUTO: 32.8 % — SIGNIFICANT CHANGE UP (ref 13–44)
MAGNESIUM SERPL-MCNC: 1.5 MG/DL — LOW (ref 1.6–2.6)
MCHC RBC-ENTMCNC: 23.2 PG — LOW (ref 27–34)
MCHC RBC-ENTMCNC: 30.2 GM/DL — LOW (ref 32–36)
MCV RBC AUTO: 76.8 FL — LOW (ref 80–100)
MONOCYTES # BLD AUTO: 0.9 K/UL — SIGNIFICANT CHANGE UP (ref 0–0.9)
MONOCYTES NFR BLD AUTO: 11.4 % — HIGH (ref 1–9)
NEUTROPHILS # BLD AUTO: 4.3 K/UL — SIGNIFICANT CHANGE UP (ref 1.8–7.4)
NEUTROPHILS NFR BLD AUTO: 52.3 % — SIGNIFICANT CHANGE UP (ref 43–77)
NT-PROBNP SERPL-SCNC: 2324 PG/ML — HIGH (ref 0–125)
PLATELET # BLD AUTO: 144 K/UL — LOW (ref 150–400)
POTASSIUM SERPL-MCNC: 3.6 MMOL/L — SIGNIFICANT CHANGE UP (ref 3.5–5.3)
POTASSIUM SERPL-SCNC: 3.6 MMOL/L — SIGNIFICANT CHANGE UP (ref 3.5–5.3)
RBC # BLD: 5.85 M/UL — HIGH (ref 4.2–5.8)
RBC # FLD: 15.4 % — HIGH (ref 10.3–14.5)
SODIUM SERPL-SCNC: 145 MMOL/L — SIGNIFICANT CHANGE UP (ref 135–145)
TOTAL CHOLESTEROL/HDL RATIO MEASUREMENT: 4.6 RATIO — SIGNIFICANT CHANGE UP (ref 3.4–9.6)
TRIGL SERPL-MCNC: 110 MG/DL — SIGNIFICANT CHANGE UP (ref 10–149)
TROPONIN I SERPL-MCNC: 0.13 NG/ML — HIGH (ref 0.01–0.04)
TSH SERPL-MCNC: 0.97 UIU/ML — SIGNIFICANT CHANGE UP (ref 0.36–3.74)
WBC # BLD: 8.2 K/UL — SIGNIFICANT CHANGE UP (ref 3.8–10.5)
WBC # FLD AUTO: 8.2 K/UL — SIGNIFICANT CHANGE UP (ref 3.8–10.5)

## 2017-11-15 PROCEDURE — 76705 ECHO EXAM OF ABDOMEN: CPT | Mod: 26

## 2017-11-15 RX ORDER — MAGNESIUM OXIDE 400 MG ORAL TABLET 241.3 MG
400 TABLET ORAL
Qty: 0 | Refills: 0 | Status: DISCONTINUED | OUTPATIENT
Start: 2017-11-15 | End: 2017-11-16

## 2017-11-15 RX ADMIN — Medication 25 MILLIGRAM(S): at 17:34

## 2017-11-15 RX ADMIN — Medication 3 MILLILITER(S): at 07:38

## 2017-11-15 RX ADMIN — MAGNESIUM OXIDE 400 MG ORAL TABLET 400 MILLIGRAM(S): 241.3 TABLET ORAL at 12:43

## 2017-11-15 RX ADMIN — Medication 3 MILLILITER(S): at 20:06

## 2017-11-15 RX ADMIN — Medication 3 MILLILITER(S): at 14:32

## 2017-11-15 RX ADMIN — AZITHROMYCIN 500 MILLIGRAM(S): 500 TABLET, FILM COATED ORAL at 23:37

## 2017-11-15 RX ADMIN — Medication 100 MILLIGRAM(S): at 17:34

## 2017-11-15 RX ADMIN — LISINOPRIL 40 MILLIGRAM(S): 2.5 TABLET ORAL at 05:40

## 2017-11-15 RX ADMIN — Medication 81 MILLIGRAM(S): at 12:43

## 2017-11-15 RX ADMIN — Medication 3 MILLIGRAM(S): at 00:18

## 2017-11-15 RX ADMIN — Medication 25 MILLIGRAM(S): at 05:40

## 2017-11-15 RX ADMIN — Medication 0.5 MILLIGRAM(S): at 20:04

## 2017-11-15 RX ADMIN — DEXTROSE MONOHYDRATE, SODIUM CHLORIDE, AND POTASSIUM CHLORIDE 50 MILLILITER(S): 50; .745; 4.5 INJECTION, SOLUTION INTRAVENOUS at 15:34

## 2017-11-15 RX ADMIN — MAGNESIUM OXIDE 400 MG ORAL TABLET 400 MILLIGRAM(S): 241.3 TABLET ORAL at 17:34

## 2017-11-15 RX ADMIN — ENOXAPARIN SODIUM 40 MILLIGRAM(S): 100 INJECTION SUBCUTANEOUS at 21:49

## 2017-11-15 RX ADMIN — ATORVASTATIN CALCIUM 10 MILLIGRAM(S): 80 TABLET, FILM COATED ORAL at 21:49

## 2017-11-15 RX ADMIN — Medication 0.5 MILLIGRAM(S): at 07:38

## 2017-11-15 RX ADMIN — Medication 100 MILLIGRAM(S): at 00:52

## 2017-11-15 RX ADMIN — Medication 0.6 MILLIGRAM(S): at 12:43

## 2017-11-15 RX ADMIN — Medication 100 MILLIGRAM(S): at 12:50

## 2017-11-15 RX ADMIN — AMLODIPINE BESYLATE 10 MILLIGRAM(S): 2.5 TABLET ORAL at 05:40

## 2017-11-15 NOTE — PROGRESS NOTE ADULT - PROBLEM SELECTOR PROBLEM 2
Gout, unspecified cause, unspecified chronicity, unspecified site
Elevated troponin level
HTN (hypertension)

## 2017-11-15 NOTE — PROGRESS NOTE ADULT - PROBLEM SELECTOR PLAN 2
per primary team
Likely secondary to demand ischemia secondary to HTN urgency ,no AMI as per card cons Card cath was d/w the patient Final report of ECHO is pending
- continue home med lisinopril with hold parameters  - continue metoprolol with hold parameters

## 2017-11-15 NOTE — PROGRESS NOTE ADULT - SUBJECTIVE AND OBJECTIVE BOX
PROGRESS NOTE  Patient is a 61y old  Male who presents with a chief complaint of     OVERNIGHT  No new issues reported by medical staff . All above noted Complains of dry cough ,JUAREZ and weakness Didn,t sleep well due to cough Ambulates in a room Seen by card .cons and card cath was offered due to low EF ,patient didnt make a desicion yet .  Comfortable ,no distress   HPI:  Patient is a 62 yo male with pmhx of HTN, HLD, Gout presenting with shortness of breath. Patient stated that he has cold like symptoms with chest congestion and cough for about 3-4 days, went to his PMD today for evaluation and was told that he has elevated blood pressure and was sent to the ED. Patient also complain of SOB for over 1 year which worsened in the past few days. Patient complains of JUAREZ, whenever he walks long distance or up stairs. Stated that he sleeps on about 5 pillows every night. Patient also admits to occasional b/l leg swelling. Stated that his chest pain started about 3-4 days ago and worsens whenever he coughs. Denies any sick contact, stated that he received his flu shot about 2 months ago. Patient denies any hx of CHF, or heart disease. Denies fever, chills, palpitations, abdominal pain, nausea, vomiting, diarrhea, constipation, urinary frequency, urgency, or dysuria, headaches, changes in vision, dizziness, numbness, or tingling.    In the ED, is afebrile, 75bpm, 182/108, RR16 @97% room air. WBC 7.3, h/h 15.2/51.6. K 3.3, BUN 21, Cr 1.3. Lactate 1.3, Ddimer 382, alk phos 206, AST 52, CK 1583, CKMB 4.7, Troponin 0.12, ProBNP 3858. CXR was negative for infiltrates or effusion. EKG showed sinus rhythm, RBBB, left anterior fascicular block, bifascular block, left ventricular hypertrophy. (2017 00:05)    PAST MEDICAL & SURGICAL HISTORY:  High cholesterol  HTN (hypertension)  Gout  H/O elbow surgery  History of hip surgery      MEDICATIONS  (STANDING):  ALBUTerol/ipratropium for Nebulization 3 milliLiter(s) Nebulizer every 6 hours  amLODIPine   Tablet 10 milliGRAM(s) Oral daily  aspirin  chewable 81 milliGRAM(s) Oral daily  atorvastatin 10 milliGRAM(s) Oral at bedtime  azithromycin   Tablet 500 milliGRAM(s) Oral every 24 hours  buDESOnide   0.5 milliGRAM(s) Respule 0.5 milliGRAM(s) Inhalation two times a day  colchicine 0.6 milliGRAM(s) Oral daily  dextrose 5% + sodium chloride 0.45% with potassium chloride 20 mEq/L 1000 milliLiter(s) (50 mL/Hr) IV Continuous <Continuous>  enoxaparin Injectable 40 milliGRAM(s) SubCutaneous every 24 hours  guaiFENesin/dextromethorphan  Syrup 10 milliLiter(s) Oral four times a day  lisinopril 40 milliGRAM(s) Oral daily  magnesium oxide 400 milliGRAM(s) Oral three times a day with meals  metoprolol     tartrate 25 milliGRAM(s) Oral two times a day    MEDICATIONS  (PRN):  benzonatate 100 milliGRAM(s) Oral three times a day PRN Cough  hydrALAZINE Injectable 5 milliGRAM(s) IV Push every 4 hours PRN for systolic BP>160 or diastolic BP >100 and call dr Bynum [phone]      OBJECTIVE    T(C): 36.3 (11-15-17 @ 05:35), Max: 36.8 (17 @ 15:30)  HR: 76 (11-15-17 @ 05:35) (74 - 84)  BP: 154/97 (11-15-17 @ 05:35) (145/98 - 160/104)  RR: 18 (11-15-17 @ 05:35) (18 - 18)  SpO2: 98% (11-15-17 @ 05:35) (98% - 99%)  Wt(kg): --  I&O's Summary    2017 07:01  -  15 Nov 2017 07:00  --------------------------------------------------------  IN: 1080 mL / OUT: 0 mL / NET: 1080 mL          REVIEW OF SYSTEMS:  CONSTITUTIONAL: No fever, weight loss, complains of generalised  weakness   EYES: No eye pain, visual disturbances, or discharge  ENMT:   No sinus or throat pain  NECK: No pain or stiffness  RESPIRATORY: dry cough , JUAREZ  CARDIOVASCULAR: No chest pain, palpitations, dizziness  GASTROINTESTINAL: No abdominal pain. No nausea, vomiting; No diarrhea or constipation. No melena or hematochezia.  GENITOURINARY: No dysuria, frequency, hematuria, or incontinence  NEUROLOGICAL: No headaches, memory loss, loss of strength, numbness, or tremors  SKIN: WNL  MUSCULOSKELETAL: ankle /feet pitting  edema ,chronic as per patient     PHYSICAL EXAM:  Appearance: NAD. VS past 24 hrs -as above   HEENT:   Moist oral mucosa. Conjunctiva clear b/l.   Neck : supple  Respiratory: Lungs CTAB,no wheesing ,coarse bs   Gastrointestinal:  Soft, nontender. No rebound. No rigidity. BS present	  Cardiovascular: RRR ,S1S2 present   Neurologic: Non-focal. Moving all extremities.  Extremities: pitting  edema of b/l LE . No erythema. No calf tenderness.  Skin: No rashes, No ecchymoses, No cyanosis.	    LABS:                        13.5   8.2   )-----------( 144      ( 15 Nov 2017 06:37 )             44.9     11-15    145  |  109<H>  |  22  ----------------------------<  94  3.6   |  26  |  1.00    Ca    8.3<L>      15 Nov 2017 06:37  Phos  3.5     11-14  Mg     1.5     11-15    TPro  5.9<L>  /  Alb  2.6<L>  /  TBili  0.6  /  DBili  .20  /  AST  49<H>  /  ALT  45  /  AlkPhos  166<H>  11-14    CAPILLARY BLOOD GLUCOSE        PT/INR - ( 2017 09:29 )   PT: 13.8 sec;   INR: 1.26 ratio         PTT - ( 2017 20:53 )  PTT:27.4 sec  Urinalysis Basic - ( 2017 01:24 )    Color: Yellow / Appearance: Clear / S.005 / pH: x  Gluc: x / Ketone: Negative  / Bili: Negative / Urobili: 1   Blood: x / Protein: 150 mg/dL / Nitrite: Negative   Leuk Esterase: Negative / RBC: 3-5 /HPF / WBC 0-2   Sq Epi: x / Non Sq Epi: x / Bacteria: Occasional        Culture - Urine (collected 2017 09:58)  Source: .Urine Clean Catch (Midstream)  Preliminary Report (15 Nov 2017 08:49):    10,000 - 49,000 CFU/mL Citrobacter koseri    Culture - Blood (collected 2017 00:30)  Source: .Blood Blood-Peripheral  Preliminary Report (15 Nov 2017 01:03):    No growth to date.    Culture - Blood (collected 2017 00:30)  Source: .Blood Blood-Peripheral  Preliminary Report (15 Nov 2017 01:03):    No growth to date.      RADIOLOGY & ADDITIONAL TESTS:   < from: US Abdomen Limited (11.15.17 @ 08:34) >  INTERPRETATION:  CLINICAL INFORMATION: Right upper quadrant pain    COMPARISON: None available.    TECHNIQUE: Sonography of the right upper quadrant.     FINDINGS:    Liver: Within normal limits.    Bile ducts: Normal caliber. Common bile duct measures 4 mm.     Gallbladder: Within normal limits.        Pancreas: Visualized portions are within normal limits.    Right kidney: 12 cm. No hydronephrosis.        Ascites: None.    IVC: Visualized portions are within normal limits.   There is a hypoechoic 3 x 3.3 cm right adrenal lesion corresponding to   CT abnormality statistically most likely representing a cortical adenoma.  IMPRESSION:     No gallbladderdisease or other acute diagnostic finding. Findings as   discussed    < end of copied text >  < from: US Duplex Venous Lower Ext Complete, Bilateral (17 @ 00:28) >  EXAM:  US DPLX LWR EXT VEINS COMPL BI                            PROCEDURE DATE:  2017          INTERPRETATION:  CLINICAL INFORMATION: Dyspnea. Leg pain.    COMPARISON: None available.    TECHNIQUE: Duplex sonography of the BILATERAL LOWER extremities with   color and spectral Doppler, with and without compression.      FINDINGS:    There is normal compressibility of the bilateral common femoral, femoral   and popliteal veins. No calf vein thrombosis is detected.    Doppler examination shows normal spontaneous and phasic flow.    IMPRESSION:     No evidence of bilateral lower extremity deep venous thrombosis.    < end of copied text >    ASSESSMENT/PLAN:

## 2017-11-15 NOTE — PROGRESS NOTE ADULT - PROBLEM SELECTOR PLAN 4
doubt ACS, likely demand ischemia 2/2 HTNsive urgency
on iv fluids,check CPK in am
- continue atorvastatin

## 2017-11-15 NOTE — PROGRESS NOTE ADULT - PROBLEM SELECTOR PLAN 1
ANTITUSSIVES,CONTINUE ZITHROMYCIN,PULM FOLLOWUP ,NEBULISED BD PRN
- possible  viral rvp r/o cardiac cause    - continue aspirin 81  - continue zithromax  - CTA result no pe   -  LE doppler no dvt   - trend CE  - f/u cardiac echo result   - strict I&Os, daily weight  - continue duoneb, pulmicort  - Pulm Consult Dr. Nunn  - Cardio consult mueller
BP better, low salt diet, trend vitals

## 2017-11-15 NOTE — PROGRESS NOTE ADULT - SUBJECTIVE AND OBJECTIVE BOX
SUMMARY ASSESSMENT PLAN 11/13/2017 ADMISSION [facility]                                  61 m PMH Htn HLD Gout current daily smoker all lovastatin cipro pnc amox PMH Parotiditis  sciatica bph sue erectile dysfn hld obesity   Patient  admitted [facility] 11/13/2017 with chest congestion RTI and uncontrolled hypertension and was found to have elevated cardiac enzymes                                                   Hyoertension was controlled using ACEI Hydralazine and BB He was ruled out for vte  (reich done as DDimer was sl high) Patient was found to have RSV RTI which  Rxed with supp care plus azithro for poss superimposed bact infection Alk Phos was elevated and reich was started COPD was Rxed with BD On  11/14 Cardio Dr FLOWER felt that C enz do not meet criteria for NSTEMI DC pending cardio clearance Patient seen /examined/evaluated  today Plan/Questions explained (to patient/ancillary staff/colleagues) Chart notes reviewd     REVIEW OF SYMPTOMS  Gets fits of cough periodically Quit smoking 25 y   Able to give ROS  Yes    RELIABLE YES  Weakness Yes  Chills No Vision changes No  Endocrine No unexplained hair loss No heat or cold intolerance  Allergy No hives  Sore throat No   Coughing blood No Headache No Confusion No Chest pain No Palpitations No Pain abdomen NO   Shortness of breath YES Vomiting NO Pain neck No Pain abdomen NO   PHYSICAL EXAM    HEENT Unremarkable PERRLA atraumatic RESP Fair air entry EXP prolonged    Harsh breath sound Resp distres mild CARDIAC S1 S2 No S3     NO JVD  ABDOMEN SOFT BS PRESENT NOT DISTENDED No hepatosplenomegaly PEDAL EDEMA present No calf tenderness  NO rash GENERAL Not TOXIC looking Awake A & O x 3   .       SUMMARY ASSESSMENT PLAN 11/13/2017 ADMISSION [facility]                                  61 m PMH Htn HLD Gout  FORMER  smoker all lovastatin cipro pnc amox PMH Parotiditis  sciatica bph sue erectile dysfn hld obesity   Patient  admitted [facility] P 11/13/2017 with chest congestion RTI and uncontrolled hypertension and was found to have elevated cardiac enzymes                                                   Hyoertension was controlled using ACEI Hydralazine and BB He was ruled out for vte  (reich done as DDimer was sl high) Patient was found to have RSV RTI which  Rxed with supp care plus azithro for poss superimposed bact infection Alk Phos was elevated and reich was started COPD was Rxed with BD On  11/14 Cardio Dr FLOWER felt that C enz do not meet criteria for NSTEMI DC pending cardio clearance Patient seen /examined/evaluated  today Plan/Questions explained (to patient/ancillary staff/colleagues) Chart notes reviewd     REVIEW OF SYMPTOMS  Gets fits of cough periodically Quit smoking 25 y   Able to give ROS  Yes    RELIABLE YES  Weakness Yes  Chills No Vision changes No  Endocrine No unexplained hair loss No heat or cold intolerance  Allergy No hives  Sore throat No   Coughing blood No Headache No Confusion No Chest pain No Palpitations No Pain abdomen NO   Shortness of breath YES Vomiting NO Pain neck No Pain abdomen NO   PHYSICAL EXAM    HEENT Unremarkable PERRLA atraumatic RESP Fair air entry EXP prolonged    Harsh breath sound Resp distres mild CARDIAC S1 S2 No S3     NO JVD  ABDOMEN SOFT BS PRESENT NOT DISTENDED No hepatosplenomegaly PEDAL EDEMA present No calf tenderness  NO rash GENERAL Not TOXIC looking Awake A & O x 3   .   VITALS/LABS  11/15/2017 afeb 80 140/80 17 100%   11/15/2017 W 8.2 Hb 13.5 Plt 144 Mg 1.5 Na 145 K 3.6 CO2 26 Cr 1   PATIENT DESCRIPTION HPI INITIAL PROBLEMS AND PLAN11/13/2017 ADMISSION [facility]                                  61 m PMH Htn HLD Gout former smoker (quit 25 y)  all lovastatin cipro pnc amox PMH Parotiditis  sciatica bph sue erectile dysfn hld obesity   MEDS lisinopril 20 tamsulosin .4 atorvastat 10   Patient  had been having chest congestion and upper resp symptoms x last 3-4 d and in office was noted to have  D 104 and was sent to [facility] ER r hypertensive urgency ro poneumonia admitted 11/13/2017 Pt given clonidine .2 in ACP office and given duoneb.2 transit                                                                                  HOSPITAL COURSE PLAN  11/13/2017 ADMISSION [facility]     Pt had elevated BP and elevated C enz suggesting hypertensive urgency  Hyoertension was controlled using ACEI Hydralazine and BB He was ruled out for vte as DDimer was sl high on arrival and was found to have RSV RTI and was Rxed with supp care plus azithro for poss superimposed bact infection Alk Phos was elevated and reich was started      PROBLEM ASSESSMENT PLAN                                                                                                 RESP DISTRESS  Managed with suppl O2   VTE   11/13 Ruled out Neg CTA and Neg V Duplex   11/13/2017 Cl susp was  mod to low   RO PNEUMONIA  11/13 RVP showed RSV  On 11/13 pct  was .12   Rxed with Azithro (11/13) for poss superimposed bact infectn   COPD   Managed with IBD ICS   RO MI  Initial C enz were positive   11/13-11/14-11/15/2017 CK [id number] Tr 1 .120 (i) - .108 (i) - .127 (i)    FD lvnx started 11/13 was changed to prophylactic dose 11/14 by PMD   Managed with nitrates statins BB acei   On  11/14 Cardio Dr FLOWER felt that C enz do not meet criteria for NSTEMI  On 11/15 Dr Azar felt that elevated troponin was sec demand ischemia due to htn urgency  HYPERTENSIVE URGENCY  Managed with Hydralazine 5 v.6p (11/14)  Lisinopril 40 (11/14) Metoprolol 25.2 (11/14)   RO CHF   ECHO  ordered on 11/13   ELEVATED ALK PHOS   11/15 Hep ABC n    11/15 US Abd No ac finding  ADRENAL MASS   CTACh 11/13  3.1 cm R adr mass with low attenuation cw adenoma To be FU as outpt  GLOBAL ISSUE/BEST PRACTICE:        PROBLEM:      Analgesia:     na                        PROBLEM: Sedation:     na               PROBLEM: HOB elevation:   y             PROBLEM: Stress ulcer proph:    na                      PROBLEM: VTE prophylaxis:      Lovenox 40 (11/14)                   PROBLEM: Glycemic control:    na   PROBLEM: Nutrition:    SIMEON (11/14)           PROBLEM: Advanced directive: na     PROBLEM: Allergies:  na    TIME SPENT Over 25 minutes aggregate care time spent on encounter; activities included   direct patient care, counseling and/or coordinating care reviewing notes, lab data/ imaging , discussion with multidisciplinary team/ patient  /family. Risks, benefits, alternatives  discussed in detail. Questions/concerns  were addressed  to the best of my ability .      SUMMARY ASSESSMENT PLAN 11/13/2017 ADMISSION [facility]                                  61 m PMH Htn HLD Gout  FORMER  smoker all lovastatin cipro pnc amox PMH Parotiditis  sciatica bph sue erectile dysfn hld obesity   Patient  admitted [facility] 11/13/2017 with chest congestion RTI and uncontrolled hypertension and was found to have elevated cardiac enzymes                                                   Hyoertension was controlled using ACEI Hydralazine and BB He was ruled out for vte  (reich done as DDimer was sl high) Patient was found to have RSV RTI which  Rxed with supp care plus azithro for poss superimposed bact infection Alk Phos was elevated and reich was started COPD was Rxed with BD On  11/14 Cardio Dr FLOWER felt that C enz do not meet criteria for NSTEMI DC pending cardio clearance

## 2017-11-15 NOTE — PROGRESS NOTE ADULT - PROBLEM SELECTOR PLAN 3
low fat diet, on statin
continue current managmnet and medications ,continue tele monitoring X 24 HRS
monitor BP  improved   sob  , trend troponins

## 2017-11-15 NOTE — PROGRESS NOTE ADULT - SUBJECTIVE AND OBJECTIVE BOX
[facility]     SUBJECTIVE: Feels better, less SOB. Min cough.      ALLERGIES:  Allergies    amoxicillin (Unknown)  Cipro (Unknown)  lovastatin (Unknown)  penicillin (Unknown)    Intolerances        Vital Signs Last 24 Hrs  T(C): 36.3 (15 Nov 2017 05:35), Max: 36.8 (2017 15:30)  T(F): 97.4 (15 Nov 2017 05:35), Max: 98.2 (2017 15:30)  HR: 76 (15 Nov 2017 05:35) (74 - 84)  BP: 154/97 (15 Nov 2017 05:35) (145/98 - 160/104)  BP(mean): --  RR: 18 (15 Nov 2017 05:35) (18 - 18)  SpO2: 98% (15 Nov 2017 05:35) (98% - 99%)    PHYSICAL EXAM:  HEAD:  Atraumatic, Normocephalic  NECK: Supple and normal thyroid.  No JVD or carotid bruit.   HEART: S1, S2 normal, no S3  PULMONARY: few scattered rhonchi b/l  ABDOMEN: Soft nontender and positive bowel sounds   EXTREMITIES: no edema b/l   NEUROLOGICAL: no focal deficits     LABS:                        13.5   8.2   )-----------( 144      ( 15 Nov 2017 06:37 )             44.9     11-15    145  |  109<H>  |  22  ----------------------------<  94  3.6   |  26  |  1.00    Ca    8.3<L>      15 Nov 2017 06:37  Phos  3.5     11-14  Mg     1.5     11-15    TPro  5.9<L>  /  Alb  2.6<L>  /  TBili  0.6  /  DBili  .20  /  AST  49<H>  /  ALT  45  /  AlkPhos  166<H>  11-14    CARDIAC MARKERS ( 15 Nov 2017 06:37 )  .127 ng/mL / x     / 987 U/L / x     / x      CARDIAC MARKERS ( 2017 09:29 )  .108 ng/mL / x     / 1043 U/L / x     / x      CARDIAC MARKERS ( 2017 20:15 )  .120 ng/mL / x     / 1583 U/L / x     / 4.7 ng/mL      PT/INR - ( 2017 09:29 )   PT: 13.8 sec;   INR: 1.26 ratio         PTT - ( 2017 20:53 )  PTT:27.4 sec  Urinalysis Basic - ( 2017 01:24 )    Color: Yellow / Appearance: Clear / S.005 / pH: x  Gluc: x / Ketone: Negative  / Bili: Negative / Urobili: 1   Blood: x / Protein: 150 mg/dL / Nitrite: Negative   Leuk Esterase: Negative / RBC: 3-5 /HPF / WBC 0-2   Sq Epi: x / Non Sq Epi: x / Bacteria: Occasional      BNPSerum Pro-Brain Natriuretic Peptide: 2324 pg/mL (11-15 @ 06:37)    TSHThyroid Stimulating Hormone, Serum: 0.97 uIU/mL (11-15 @ 06:37)    LIVER FUNCTIONS - ( 2017 09:29 )  Alb: 2.6 g/dL / Pro: 5.9 g/dL / ALK PHOS: 166 U/L / ALT: 45 U/L / AST: 49 U/L / GGT: x             EKG: NSR, RBBB, LAD    ECHO: prelim-> EF 30-35%    IMAGING:< from: Xray Chest 2 Views PA/Lat (17 @ 20:11) >  EXAM:  CHEST PA & LAT                            PROCEDURE DATE:  2017          INTERPRETATION:  History: Half congestion and dyspnea    PA and lateral radiographs of the chest are performed. There are no prior   studies for comparison.    The mediastinal silhouette is normal. The lita are not enlarged. There is   cardiomegaly. The trachea is midline. There is no focal infiltrate or   pleural effusion. The osseous structures are intact.    Impression: Cardiac regular. No active disease.    ALIZA ASHRAF M.D.,ATTENDING RADIOLOGIST  This document has been electronically signed. 2017  8:52PM      < from: CT Angio Chest w/ IV Cont (17 @ 23:10) >  EXAM:  CT ANGIO CHEST (W)AW IC                            PROCEDURE DATE:  2017          INTERPRETATION:  .    CLINICAL INFORMATION: Shortness of breath and elevated d-dimer.    TECHNIQUE: Helical axial images were obtained of the chest and upper   abdomen using CT angiographic protocol. 78 mls of Omnipaque-350  was   administered intravenously without complication and 22 mls were   discarded. Sagittal and coronal reformatted images were obtained from the   source data. Axial MIP reconstructed images were obtained from the source   data and were also reviewed.    COMPARISON: No prior chest CT angiogram examinations are available for   comparison.    FINDINGS: Examination of the pulmonary arterial vessels demonstrates an   optimal contrast bolus. No filling defects are notable within the   pulmonary arterial vessels to suggest pulmonary embolism. The main   pulmonary artery is normal in caliber.    The heart size is enlarged. The pericardium appears unremarkable. There   are atherosclerotic calcifications of the imaged coronary arteries,   aorta, and branch vessels. The imaged portions of the aorta are normal in   caliber.    There is no axillary, mediastinal, or hilar lymphadenopathy.    Bilateral symmetric gynecomastia is noted.    The central airways are patent. Scattered tree-in-bud opacities are   notable within the bilateral lower lobes. Scattered areas of linear   atelectasis are notable throughout both lungs. No pleural effusions are   evident.    There is reflux ofIV contrast into the inferior vena cava which is a   nonspecific finding. There is left adrenal gland thickening. There is a   3.1 cm right-sided adrenal mass which demonstrates low attenuation, and   is most compatible with an adenoma. A 2.1 cm leftupper pole renal cyst   is noted.    There are mild multilevel degenerative changes of the thoracic spine.   There is diffuse heterogeneous osteosclerosis. Findings are nonspecific.    IMPRESSION: No pulmonary embolism.    Cardiomegaly.    Nonspecific heterogeneous osteosclerosis throughout the bony structures   which may reflect renal osteodystrophy or other metabolic process.   Metastatic disease is difficult to exclude. Consider further evaluation   with bone scan.    Right-sided adrenal adenoma measuring 3.1 cm.    TOBY ALEXIS M.D., ATTENDING RADIOLOGIST  This document has been electronically signed. 2017  7:36AM      MEDICATIONS  (STANDING):  ALBUTerol/ipratropium for Nebulization 3 milliLiter(s) Nebulizer every 6 hours  amLODIPine   Tablet 10 milliGRAM(s) Oral daily  aspirin  chewable 81 milliGRAM(s) Oral daily  atorvastatin 10 milliGRAM(s) Oral at bedtime  azithromycin   Tablet 500 milliGRAM(s) Oral every 24 hours  buDESOnide   0.5 milliGRAM(s) Respule 0.5 milliGRAM(s) Inhalation two times a day  colchicine 0.6 milliGRAM(s) Oral daily  dextrose 5% + sodium chloride 0.45% with potassium chloride 20 mEq/L 1000 milliLiter(s) (50 mL/Hr) IV Continuous <Continuous>  enoxaparin Injectable 40 milliGRAM(s) SubCutaneous every 24 hours  guaiFENesin/dextromethorphan  Syrup 10 milliLiter(s) Oral four times a day  lisinopril 40 milliGRAM(s) Oral daily  magnesium oxide 400 milliGRAM(s) Oral three times a day with meals  metoprolol     tartrate 25 milliGRAM(s) Oral two times a day    MEDICATIONS  (PRN):  benzonatate 100 milliGRAM(s) Oral three times a day PRN Cough  hydrALAZINE Injectable 5 milliGRAM(s) IV Push every 4 hours PRN for systolic BP>160 or diastolic BP >100 and call dr Bynum [phone]

## 2017-11-15 NOTE — PROGRESS NOTE ADULT - ASSESSMENT
Patient is a 60 yo male with pmhx of HTN, HLD, Gout presenting with shortness of breath, admit for respiratory distress with  hypertensive urgency , high troponin
- BP improving  - Low EF on Echo prelim-> pt may need card cath, if agreeable  - notes and meds reviewed  - case d/w Dr Price  - check Trop in am

## 2017-11-16 ENCOUNTER — TRANSCRIPTION ENCOUNTER (OUTPATIENT)
Age: 62
End: 2017-11-16

## 2017-11-16 VITALS — DIASTOLIC BLOOD PRESSURE: 92 MMHG | SYSTOLIC BLOOD PRESSURE: 148 MMHG

## 2017-11-16 DIAGNOSIS — D35.00 BENIGN NEOPLASM OF UNSPECIFIED ADRENAL GLAND: ICD-10-CM

## 2017-11-16 LAB
-  AMIKACIN: SIGNIFICANT CHANGE UP
-  AMPICILLIN/SULBACTAM: SIGNIFICANT CHANGE UP
-  AMPICILLIN: SIGNIFICANT CHANGE UP
-  AZTREONAM: SIGNIFICANT CHANGE UP
-  CEFAZOLIN: SIGNIFICANT CHANGE UP
-  CEFEPIME: SIGNIFICANT CHANGE UP
-  CEFOXITIN: SIGNIFICANT CHANGE UP
-  CEFTAZIDIME: SIGNIFICANT CHANGE UP
-  CEFTRIAXONE: SIGNIFICANT CHANGE UP
-  CIPROFLOXACIN: SIGNIFICANT CHANGE UP
-  ERTAPENEM: SIGNIFICANT CHANGE UP
-  GENTAMICIN: SIGNIFICANT CHANGE UP
-  IMIPENEM: SIGNIFICANT CHANGE UP
-  LEVOFLOXACIN: SIGNIFICANT CHANGE UP
-  MEROPENEM: SIGNIFICANT CHANGE UP
-  NITROFURANTOIN: SIGNIFICANT CHANGE UP
-  PIPERACILLIN/TAZOBACTAM: SIGNIFICANT CHANGE UP
-  TOBRAMYCIN: SIGNIFICANT CHANGE UP
-  TRIMETHOPRIM/SULFAMETHOXAZOLE: SIGNIFICANT CHANGE UP
ALBUMIN SERPL ELPH-MCNC: 2.9 G/DL — LOW (ref 3.3–5)
ALP SERPL-CCNC: 168 U/L — HIGH (ref 40–120)
ALT FLD-CCNC: 46 U/L — SIGNIFICANT CHANGE UP (ref 12–78)
ANION GAP SERPL CALC-SCNC: 9 MMOL/L — SIGNIFICANT CHANGE UP (ref 5–17)
AST SERPL-CCNC: 43 U/L — HIGH (ref 15–37)
BASE EXCESS BLDV CALC-SCNC: 1.1 MMOL/L — SIGNIFICANT CHANGE UP (ref -2–2)
BASOPHILS # BLD AUTO: 0.2 K/UL — SIGNIFICANT CHANGE UP (ref 0–0.2)
BASOPHILS NFR BLD AUTO: 2.2 % — HIGH (ref 0–2)
BILIRUB SERPL-MCNC: 0.6 MG/DL — SIGNIFICANT CHANGE UP (ref 0.2–1.2)
BUN SERPL-MCNC: 16 MG/DL — SIGNIFICANT CHANGE UP (ref 7–23)
CALCIUM SERPL-MCNC: 7.9 MG/DL — LOW (ref 8.5–10.1)
CHLORIDE SERPL-SCNC: 108 MMOL/L — SIGNIFICANT CHANGE UP (ref 96–108)
CK SERPL-CCNC: 1134 U/L — HIGH (ref 26–308)
CO2 SERPL-SCNC: 27 MMOL/L — SIGNIFICANT CHANGE UP (ref 22–31)
CREAT SERPL-MCNC: 0.97 MG/DL — SIGNIFICANT CHANGE UP (ref 0.5–1.3)
CULTURE RESULTS: SIGNIFICANT CHANGE UP
EOSINOPHIL # BLD AUTO: 0.2 K/UL — SIGNIFICANT CHANGE UP (ref 0–0.5)
EOSINOPHIL NFR BLD AUTO: 3.1 % — SIGNIFICANT CHANGE UP (ref 0–6)
GLUCOSE SERPL-MCNC: 95 MG/DL — SIGNIFICANT CHANGE UP (ref 70–99)
HCO3 BLDV-SCNC: 24 MMOL/L — SIGNIFICANT CHANGE UP (ref 21–29)
HCT VFR BLD CALC: 44.6 % — SIGNIFICANT CHANGE UP (ref 39–50)
HGB BLD-MCNC: 13.3 G/DL — SIGNIFICANT CHANGE UP (ref 13–17)
HOROWITZ INDEX BLDV+IHG-RTO: 21 — SIGNIFICANT CHANGE UP
LYMPHOCYTES # BLD AUTO: 2.5 K/UL — SIGNIFICANT CHANGE UP (ref 1–3.3)
LYMPHOCYTES # BLD AUTO: 31 % — SIGNIFICANT CHANGE UP (ref 13–44)
MAGNESIUM SERPL-MCNC: 1.5 MG/DL — LOW (ref 1.6–2.6)
MCHC RBC-ENTMCNC: 22.8 PG — LOW (ref 27–34)
MCHC RBC-ENTMCNC: 29.8 GM/DL — LOW (ref 32–36)
MCV RBC AUTO: 76.6 FL — LOW (ref 80–100)
METHOD TYPE: SIGNIFICANT CHANGE UP
MONOCYTES # BLD AUTO: 0.6 K/UL — SIGNIFICANT CHANGE UP (ref 0–0.9)
MONOCYTES NFR BLD AUTO: 7 % — SIGNIFICANT CHANGE UP (ref 1–9)
NEUTROPHILS # BLD AUTO: 4.6 K/UL — SIGNIFICANT CHANGE UP (ref 1.8–7.4)
NEUTROPHILS NFR BLD AUTO: 56.6 % — SIGNIFICANT CHANGE UP (ref 43–77)
ORGANISM # SPEC MICROSCOPIC CNT: SIGNIFICANT CHANGE UP
ORGANISM # SPEC MICROSCOPIC CNT: SIGNIFICANT CHANGE UP
PCO2 BLDV: 57 MMHG — HIGH (ref 35–50)
PH BLDV: 7.3 — LOW (ref 7.35–7.45)
PHOSPHATE SERPL-MCNC: 3.3 MG/DL — SIGNIFICANT CHANGE UP (ref 2.5–4.5)
PLATELET # BLD AUTO: 151 K/UL — SIGNIFICANT CHANGE UP (ref 150–400)
PO2 BLDV: 65 MMHG — HIGH (ref 25–45)
POTASSIUM SERPL-MCNC: 3.4 MMOL/L — LOW (ref 3.5–5.3)
POTASSIUM SERPL-SCNC: 3.4 MMOL/L — LOW (ref 3.5–5.3)
PROT SERPL-MCNC: 6.4 G/DL — SIGNIFICANT CHANGE UP (ref 6–8.3)
RBC # BLD: 5.82 M/UL — HIGH (ref 4.2–5.8)
RBC # FLD: 15.6 % — HIGH (ref 10.3–14.5)
S PNEUM AG SER QL: SIGNIFICANT CHANGE UP
SAO2 % BLDV: 89 % — HIGH (ref 67–88)
SODIUM SERPL-SCNC: 144 MMOL/L — SIGNIFICANT CHANGE UP (ref 135–145)
SPECIMEN SOURCE: SIGNIFICANT CHANGE UP
WBC # BLD: 8.1 K/UL — SIGNIFICANT CHANGE UP (ref 3.8–10.5)
WBC # FLD AUTO: 8.1 K/UL — SIGNIFICANT CHANGE UP (ref 3.8–10.5)

## 2017-11-16 PROCEDURE — 96365 THER/PROPH/DIAG IV INF INIT: CPT | Mod: 59

## 2017-11-16 PROCEDURE — 85027 COMPLETE CBC AUTOMATED: CPT

## 2017-11-16 PROCEDURE — 82248 BILIRUBIN DIRECT: CPT

## 2017-11-16 PROCEDURE — 93306 TTE W/DOPPLER COMPLETE: CPT

## 2017-11-16 PROCEDURE — 96375 TX/PRO/DX INJ NEW DRUG ADDON: CPT

## 2017-11-16 PROCEDURE — 76705 ECHO EXAM OF ABDOMEN: CPT

## 2017-11-16 PROCEDURE — 93005 ELECTROCARDIOGRAM TRACING: CPT

## 2017-11-16 PROCEDURE — 80074 ACUTE HEPATITIS PANEL: CPT

## 2017-11-16 PROCEDURE — 84484 ASSAY OF TROPONIN QUANT: CPT

## 2017-11-16 PROCEDURE — 87633 RESP VIRUS 12-25 TARGETS: CPT

## 2017-11-16 PROCEDURE — 87798 DETECT AGENT NOS DNA AMP: CPT

## 2017-11-16 PROCEDURE — 81001 URINALYSIS AUTO W/SCOPE: CPT

## 2017-11-16 PROCEDURE — 87086 URINE CULTURE/COLONY COUNT: CPT

## 2017-11-16 PROCEDURE — 87581 M.PNEUMON DNA AMP PROBE: CPT

## 2017-11-16 PROCEDURE — 83880 ASSAY OF NATRIURETIC PEPTIDE: CPT

## 2017-11-16 PROCEDURE — 82977 ASSAY OF GGT: CPT

## 2017-11-16 PROCEDURE — 84443 ASSAY THYROID STIM HORMONE: CPT

## 2017-11-16 PROCEDURE — 94640 AIRWAY INHALATION TREATMENT: CPT

## 2017-11-16 PROCEDURE — 87899 AGENT NOS ASSAY W/OPTIC: CPT

## 2017-11-16 PROCEDURE — 96376 TX/PRO/DX INJ SAME DRUG ADON: CPT

## 2017-11-16 PROCEDURE — 99285 EMERGENCY DEPT VISIT HI MDM: CPT | Mod: 25

## 2017-11-16 PROCEDURE — 84100 ASSAY OF PHOSPHORUS: CPT

## 2017-11-16 PROCEDURE — 80053 COMPREHEN METABOLIC PANEL: CPT

## 2017-11-16 PROCEDURE — 82024 ASSAY OF ACTH: CPT

## 2017-11-16 PROCEDURE — 80048 BASIC METABOLIC PNL TOTAL CA: CPT

## 2017-11-16 PROCEDURE — 93970 EXTREMITY STUDY: CPT

## 2017-11-16 PROCEDURE — 84145 PROCALCITONIN (PCT): CPT

## 2017-11-16 PROCEDURE — 83735 ASSAY OF MAGNESIUM: CPT

## 2017-11-16 PROCEDURE — 85730 THROMBOPLASTIN TIME PARTIAL: CPT

## 2017-11-16 PROCEDURE — 96372 THER/PROPH/DIAG INJ SC/IM: CPT | Mod: 59

## 2017-11-16 PROCEDURE — 71046 X-RAY EXAM CHEST 2 VIEWS: CPT

## 2017-11-16 PROCEDURE — 87486 CHLMYD PNEUM DNA AMP PROBE: CPT

## 2017-11-16 PROCEDURE — 85379 FIBRIN DEGRADATION QUANT: CPT

## 2017-11-16 PROCEDURE — 83605 ASSAY OF LACTIC ACID: CPT

## 2017-11-16 PROCEDURE — 87186 SC STD MICRODIL/AGAR DIL: CPT

## 2017-11-16 PROCEDURE — 87449 NOS EACH ORGANISM AG IA: CPT

## 2017-11-16 PROCEDURE — 85610 PROTHROMBIN TIME: CPT

## 2017-11-16 PROCEDURE — 80061 LIPID PANEL: CPT

## 2017-11-16 PROCEDURE — 82803 BLOOD GASES ANY COMBINATION: CPT

## 2017-11-16 PROCEDURE — 87040 BLOOD CULTURE FOR BACTERIA: CPT

## 2017-11-16 PROCEDURE — 71275 CT ANGIOGRAPHY CHEST: CPT

## 2017-11-16 PROCEDURE — 94760 N-INVAS EAR/PLS OXIMETRY 1: CPT

## 2017-11-16 PROCEDURE — 82553 CREATINE MB FRACTION: CPT

## 2017-11-16 PROCEDURE — 82550 ASSAY OF CK (CPK): CPT

## 2017-11-16 RX ORDER — METOPROLOL TARTRATE 50 MG
1 TABLET ORAL
Qty: 60 | Refills: 0 | OUTPATIENT
Start: 2017-11-16 | End: 2017-12-16

## 2017-11-16 RX ORDER — BUDESONIDE AND FORMOTEROL FUMARATE DIHYDRATE 160; 4.5 UG/1; UG/1
2 AEROSOL RESPIRATORY (INHALATION)
Qty: 120 | Refills: 0 | OUTPATIENT
Start: 2017-11-16 | End: 2017-12-16

## 2017-11-16 RX ORDER — LISINOPRIL 2.5 MG/1
1 TABLET ORAL
Qty: 30 | Refills: 0 | OUTPATIENT
Start: 2017-11-16 | End: 2017-12-16

## 2017-11-16 RX ORDER — GUAIFENESIN/DEXTROMETHORPHAN 600MG-30MG
10 TABLET, EXTENDED RELEASE 12 HR ORAL
Qty: 0 | Refills: 0 | COMMUNITY
Start: 2017-11-16

## 2017-11-16 RX ORDER — AZITHROMYCIN 500 MG/1
1 TABLET, FILM COATED ORAL
Qty: 3 | Refills: 0 | OUTPATIENT
Start: 2017-11-16 | End: 2017-11-19

## 2017-11-16 RX ORDER — LISINOPRIL 2.5 MG/1
1 TABLET ORAL
Qty: 0 | Refills: 0 | COMMUNITY
Start: 2017-11-16

## 2017-11-16 RX ORDER — LISINOPRIL 2.5 MG/1
1 TABLET ORAL
Qty: 0 | Refills: 0 | COMMUNITY

## 2017-11-16 RX ORDER — ASPIRIN/CALCIUM CARB/MAGNESIUM 324 MG
1 TABLET ORAL
Qty: 0 | Refills: 0 | COMMUNITY
Start: 2017-11-16

## 2017-11-16 RX ORDER — ATORVASTATIN CALCIUM 80 MG/1
1 TABLET, FILM COATED ORAL
Qty: 0 | Refills: 0 | COMMUNITY
Start: 2017-11-16

## 2017-11-16 RX ORDER — ATORVASTATIN CALCIUM 80 MG/1
1 TABLET, FILM COATED ORAL
Qty: 0 | Refills: 0 | COMMUNITY

## 2017-11-16 RX ORDER — AMLODIPINE BESYLATE 2.5 MG/1
1 TABLET ORAL
Qty: 30 | Refills: 0 | OUTPATIENT
Start: 2017-11-16 | End: 2017-12-16

## 2017-11-16 RX ORDER — POTASSIUM CHLORIDE 20 MEQ
20 PACKET (EA) ORAL
Qty: 0 | Refills: 0 | Status: COMPLETED | OUTPATIENT
Start: 2017-11-16 | End: 2017-11-16

## 2017-11-16 RX ORDER — MAGNESIUM SULFATE 500 MG/ML
1 VIAL (ML) INJECTION ONCE
Qty: 0 | Refills: 0 | Status: COMPLETED | OUTPATIENT
Start: 2017-11-16 | End: 2017-11-16

## 2017-11-16 RX ADMIN — Medication 20 MILLIEQUIVALENT(S): at 10:31

## 2017-11-16 RX ADMIN — Medication 100 MILLIGRAM(S): at 11:45

## 2017-11-16 RX ADMIN — Medication 0.6 MILLIGRAM(S): at 11:45

## 2017-11-16 RX ADMIN — MAGNESIUM OXIDE 400 MG ORAL TABLET 400 MILLIGRAM(S): 241.3 TABLET ORAL at 11:45

## 2017-11-16 RX ADMIN — Medication 81 MILLIGRAM(S): at 11:45

## 2017-11-16 RX ADMIN — Medication 3 MILLILITER(S): at 01:30

## 2017-11-16 RX ADMIN — Medication 3 MILLILITER(S): at 13:55

## 2017-11-16 RX ADMIN — Medication 20 MILLIEQUIVALENT(S): at 11:45

## 2017-11-16 RX ADMIN — Medication 3 MILLILITER(S): at 08:08

## 2017-11-16 RX ADMIN — MAGNESIUM OXIDE 400 MG ORAL TABLET 400 MILLIGRAM(S): 241.3 TABLET ORAL at 07:33

## 2017-11-16 RX ADMIN — Medication 100 GRAM(S): at 10:31

## 2017-11-16 RX ADMIN — AMLODIPINE BESYLATE 10 MILLIGRAM(S): 2.5 TABLET ORAL at 06:23

## 2017-11-16 RX ADMIN — Medication 25 MILLIGRAM(S): at 06:23

## 2017-11-16 RX ADMIN — LISINOPRIL 40 MILLIGRAM(S): 2.5 TABLET ORAL at 06:23

## 2017-11-16 RX ADMIN — Medication 0.5 MILLIGRAM(S): at 08:08

## 2017-11-16 RX ADMIN — DEXTROSE MONOHYDRATE, SODIUM CHLORIDE, AND POTASSIUM CHLORIDE 50 MILLILITER(S): 50; .745; 4.5 INJECTION, SOLUTION INTRAVENOUS at 11:45

## 2017-11-16 NOTE — CONSULT NOTE ADULT - PROBLEM SELECTOR RECOMMENDATION 9
hx of htn, hypokalemia  check plasma renin activity, serum aldosterone  check plasma fractionated metanephrines  midnight salivary cortisol level  pt to f/u with endocrinologist, dr. marjan Sandoval

## 2017-11-16 NOTE — DISCHARGE NOTE ADULT - HOSPITAL COURSE
61 m PMH Htn HLD Gout former smoker (quit 25 y)  all lovastatin cipro pnc amox PMH Parotiditis  sciatica bph sue erectile dysfn hld obesity   MEDS lisinopril 20 tamsulosin .4 atorvastat 10   Patient  had been having chest congestion and upper resp symptoms x last 3-4 d and in office was noted to have  D 104 and was sent to [facility] ER r hypertensive urgency ro poneumonia admitted 11/13/2017 Pt given clonidine .2 in [facility] office and given duoneb.2 transit                                                                                  HOSPITAL COURSE PLAN  11/13/2017 ADMISSION [facility]     Pt had elevated BP and elevated C enz suggesting hypertensive urgency , bp was controlled using ACEI Hydralazine and BB He was ruled out for vte as DDimer was sl high on arrival and was found to have RSV RTI and was Rxed with supp care plus azithro for poss superimposed bact infection Alk Phos was elevated and reich was started    Seen by endocr due to adrenal adenoma and outpatient followup and testing  is recommended upon discharge   PROBLEM ASSESSMENT PLAN                                                                                                 RESP DISTRESS  Managed with suppl O2   VTE   11/13 Ruled out Neg CTA and Neg V Duplex   11/13/2017 Cl susp was  mod to low   Viral syndrome Ruled out for pneumonia   11/13 RVP showed RSV  On 11/13 pct  was .12   Rxed with Azithro (11/13) for poss superimposed bact infectn   COPD   Managed with IBD ICS   RO MI  Initial C enz were positive   11/13-11/14-11/15/2017 CK [id number] Tr 1 .120 (i) - .108 (i) - .127 (i)    FD lvnx started 11/13 was changed to prophylactic dose 11/14 by PMD   Managed with nitrates statins BB acei   On  11/14 Cardio Dr FLOWER felt that C enz do not meet criteria for NSTEMI  On 11/15 Dr Azar felt that elevated troponin was sec demand ischemia due to htn urgency  HYPERTENSIVE URGENCY  Managed with Hydralazine 5 v.6p (11/14)  Lisinopril 40 (11/14) Metoprolol 25.2 (11/14)   RO CHF   ECHO  ordered on 11/13   ELEVATED cpk   Oral hydration is enouraged and repeat CPK level in PCP office next week   ADRENAL MASS   CTACh 11/13  3.1 cm R adr mass with low attenuation cw adenoma To be FU as outpt61 m PMH Htn HLD Gout  FORMER  smoker all lovastatin cipro pnc amox PMH Parotiditis  sciatica bph sue erectile dysfn hld obesity   Patient  admitted [facility] 11/13/2017 with chest congestion RTI and uncontrolled hypertension and was found to have elevated cardiac enzymes                                                   Hyoertension was controlled using ACEI Hydralazine and BB He was ruled out for vte  (reich done as DDimer was sl high) Patient was found to have RSV RTI which  Rxed with supp care plus azithro for poss superimposed bact infection Alk Phos was elevated and reich was started COPD was Rxed with BD On  11/14 Cardio Dr FLOWER felt that C enz do not meet criteria for NSTEMI Card cath was offered by card consult due to low EF but patient refused it and plans to followup with cardiologist as outpatient

## 2017-11-16 NOTE — DISCHARGE NOTE ADULT - SECONDARY DIAGNOSIS.
Elevated troponin level Adrenal adenoma HTN (hypertension) High cholesterol Rhabdomyolysis Need for prophylactic measure

## 2017-11-16 NOTE — CONSULT NOTE ADULT - SUBJECTIVE AND OBJECTIVE BOX
Patient is a 61y old  Male who presents with a chief complaint of     Reason For Consult: adrenal nodule    HPI:  Patient is a 60 yo male with pmhx of HTN, HLD, Gout presenting with shortness of breath. Patient stated that he has cold like symptoms with chest congestion and cough for about 3-4 days, went to his PMD today for evaluation and was told that he has elevated blood pressure and was sent to the ED. Patient also complain of SOB for over 1 year which worsened in the past few days. Patient complains of JUAREZ, whenever he walks long distance or up stairs. Stated that he sleeps on about 5 pillows every night. Patient also admits to occasional b/l leg swelling. Stated that his chest pain started about 3-4 days ago and worsens whenever he coughs. Denies any sick contact, stated that he received his flu shot about 2 months ago. Patient denies any hx of CHF, or heart disease. Denies fever, chills, palpitations, abdominal pain, nausea, vomiting, diarrhea, constipation, urinary frequency, urgency, or dysuria, headaches, changes in vision, dizziness, numbness, or tingling.    In the ED, is afebrile, 75bpm, 182/108, RR16 @97% room air. WBC 7.3, h/h 15.2/51.6. K 3.3, BUN 21, Cr 1.3. Lactate 1.3, Ddimer 382, alk phos 206, AST 52, CK 1583, CKMB 4.7, Troponin 0.12, ProBNP 3858. CXR was negative for infiltrates or effusion. EKG showed sinus rhythm, RBBB, left anterior fascicular block, bifascular block, left ventricular hypertrophy. (14 Nov 2017 00:05)      PAST MEDICAL & SURGICAL HISTORY:  High cholesterol  HTN (hypertension)  Gout  H/O elbow surgery  History of hip surgery      FAMILY HISTORY:  Family history of heart disease (Father)        Social History:    MEDICATIONS  (STANDING):  ALBUTerol/ipratropium for Nebulization 3 milliLiter(s) Nebulizer every 6 hours  amLODIPine   Tablet 10 milliGRAM(s) Oral daily  aspirin  chewable 81 milliGRAM(s) Oral daily  atorvastatin 10 milliGRAM(s) Oral at bedtime  azithromycin   Tablet 500 milliGRAM(s) Oral every 24 hours  buDESOnide   0.5 milliGRAM(s) Respule 0.5 milliGRAM(s) Inhalation two times a day  colchicine 0.6 milliGRAM(s) Oral daily  dextrose 5% + sodium chloride 0.45% with potassium chloride 20 mEq/L 1000 milliLiter(s) (50 mL/Hr) IV Continuous <Continuous>  enoxaparin Injectable 40 milliGRAM(s) SubCutaneous every 24 hours  guaiFENesin/dextromethorphan  Syrup 10 milliLiter(s) Oral four times a day  lisinopril 40 milliGRAM(s) Oral daily  magnesium oxide 400 milliGRAM(s) Oral three times a day with meals  magnesium sulfate  IVPB 1 Gram(s) IV Intermittent once  metoprolol     tartrate 25 milliGRAM(s) Oral two times a day  potassium chloride    Tablet ER 20 milliEquivalent(s) Oral every 2 hours    MEDICATIONS  (PRN):  benzonatate 100 milliGRAM(s) Oral three times a day PRN Cough  hydrALAZINE Injectable 5 milliGRAM(s) IV Push every 4 hours PRN for systolic BP>160 or diastolic BP >100 and call dr Bynum [phone]        T(C): 36.3 (11-16-17 @ 07:45), Max: 36.7 (11-15-17 @ 20:25)  HR: 76 (11-16-17 @ 08:08) (69 - 84)  BP: 142/94 (11-16-17 @ 07:45) (142/94 - 165/97)  RR: 17 (11-16-17 @ 07:45) (17 - 18)  SpO2: 100% (11-16-17 @ 08:08) (98% - 100%)  Wt(kg): --    PHYSICAL EXAM:  GENERAL: NAD, well-groomed, well-developed  HEAD:  Atraumatic, Normocephalic  NECK: Supple, No JVD, Normal thyroid  CHEST/LUNG: Clear to percussion bilaterally; No rales, rhonchi, wheezing, or rubs  HEART: Regular rate and rhythm; No murmurs, rubs, or gallops  ABDOMEN: Soft, Nontender, Nondistended; Bowel sounds present  EXTREMITIES:  2+ Peripheral Pulses, No clubbing, cyanosis, or edema  SKIN: No rashes or lesions    CAPILLARY BLOOD GLUCOSE                                13.3   8.1   )-----------( 151      ( 16 Nov 2017 06:05 )             44.6       CMP:  11-16 @ 06:05  SGPT 46  Albumin 2.9   Alk Phos 168   Anion Gap 9   SGOT 43   Total Bili 0.6   BUN 16   Calcium Total 7.9   CO2 27   Chloride 108   Creatinine 0.97   eGFR if AA 97   eGFR if non AA 84   Glucose 95   Potassium 3.4   Protein 6.4   Sodium 144      Thyroid Function Tests:  11-15 @ 06:37 TSH 0.97 FreeT4 -- T3 -- Anti TPO -- Anti Thyroglobulin Ab -- TSI --      Diabetes Tests:       Radiology:

## 2017-11-16 NOTE — DISCHARGE NOTE ADULT - MEDICATION SUMMARY - MEDICATIONS TO TAKE
I will START or STAY ON the medications listed below when I get home from the hospital:    aspirin 81 mg oral tablet, chewable  -- 1 tab(s) by mouth once a day  -- Indication: For Need for prophylactic measure    lisinopril 40 mg oral tablet  -- 1 tab(s) by mouth once a day  -- Indication: For HTN (hypertension)    colchicine 0.6 mg oral tablet  -- 1 tab(s) by mouth once a day  -- Indication: For Gout    atorvastatin 10 mg oral tablet  -- 1 tab(s) by mouth once a day (at bedtime)  -- Indication: For High cholesterol HOLD FOR NOW UNTIL CPK IS BACK TO NORMAL CHECK CPK LEVEL ON MONDAY IN PCP OFFICE    metoprolol tartrate 25 mg oral tablet  -- 1 tab(s) by mouth 2 times a day  -- Indication: For HTN (hypertension)    Symbicort 160 mcg-4.5 mcg/inh inhalation aerosol  -- 2 puff(s) inhaled 2 times a day  -- Indication: For COPD    amLODIPine 10 mg oral tablet  -- 1 tab(s) by mouth once a day  -- Indication: For HTN (hypertension)    azithromycin 500 mg oral tablet  -- 1 tab(s) by mouth every 24 hours  -- Indication: For BRONCHITIS    dextromethorphan-guaifenesin 10 mg-100 mg/5 mL oral liquid  -- 10 milliliter(s) by mouth 4 times a day  -- Indication: For COUGH AS NEEDED X 3 DAYS  OTC

## 2017-11-16 NOTE — PROGRESS NOTE ADULT - SUBJECTIVE AND OBJECTIVE BOX
Chart reviewed: Assessment plan is as below Note will be updated as more  patient data is gathered     VITALS/LABS  11/16/2017 afeb 76 150/90   11/16/2017 W 8.1 Hb 13.3 Plt 151 Na 144 K 3.4 CO2 27 Cr .9   11/16 Mg K replaced   PATIENT DESCRIPTION HPI INITIAL PROBLEMS AND PLAN11/13/2017 ADMISSION [facility]                                  61 m PMH Htn HLD Gout former smoker (quit 25 y)  all lovastatin cipro pnc amox PMH Parotiditis  sciatica bph sue erectile dysfn hld obesity   MEDS lisinopril 20 tamsulosin .4 atorvastat 10   Patient  had been having chest congestion and upper resp symptoms x last 3-4 d and in office was noted to have  D 104 and was sent to [facility] ER r hypertensive urgency ro poneumonia admitted 11/13/2017 Pt given clonidine .2 in ACP office and given duoneb.2 transit                                                                                  HOSPITAL COURSE PLAN  11/13/2017 ADMISSION [facility]     Pt had elevated BP and elevated C enz suggesting hypertensive urgency  Hyoertension was controlled using ACEI Hydralazine and BB He was ruled out for vte as DDimer was sl high on arrival and was found to have RSV RTI and was Rxed with supp care plus azithro for poss superimposed bact infection Alk Phos was elevated and reich was started   PROBLEM ASSESSMENT PLAN                                                                                                 RESP DISTRESS  Managed with suppl O2   VTE   11/13 Ruled out Neg CTA and Neg V Duplex   11/13/2017 Cl susp was  mod to low   RO PNEUMONIA  11/13 RVP showed RSV  On 11/13 pct  was .12   Rxed with Azithro (11/13) for poss superimposed bact infectn   COPD   Managed with IBD ICS   RO MI  Initial C enz were positive   11/13-11/14-11/15/2017 CK [id number] Tr 1 .120 (i) - .108 (i) - .127 (i)    FD lvnx started 11/13 was changed to prophylactic dose 11/14 by PMD   Managed with nitrates statins BB acei   On  11/14 Cardio Dr FLOWER felt that C enz do not meet criteria for NSTEMI  On 11/15 Dr Azar felt that elevated troponin was sec demand ischemia due to htn urgency  HYPERTENSIVE URGENCY  Managed with Hydralazine 5 v.6p (11/14)  Lisinopril 40 (11/14) Metoprolol 25.2 (11/14)   RO CHF   ECHO  ordered on 11/13   ELEVATED ALK PHOS   11/15 Hep ABC n    11/15 US Abd No ac finding  ADRENAL MASS   CTACh 11/13  3.1 cm R adr mass with low attenuation cw adenoma To be FU as outpt  GLOBAL ISSUE/BEST PRACTICE:        PROBLEM:      Analgesia:     na                        PROBLEM: Sedation:     na               PROBLEM: HOB elevation:   y             PROBLEM: Stress ulcer proph:    na                      PROBLEM: VTE prophylaxis:      Lovenox 40 (11/14)                   PROBLEM: Glycemic control:    na   PROBLEM: Nutrition:    SIMEON (11/14)           PROBLEM: Advanced directive: na     PROBLEM: Allergies:  na    TIME SPENT Over 25 minutes aggregate care time spent on encounter; activities included   direct patient care, counseling and/or coordinating care reviewing notes, lab data/ imaging , discussion with multidisciplinary team/ patient  /family. Risks, benefits, alternatives  discussed in detail. Questions/concerns  were addressed  to the best of my ability .  SUMMARY ASSESSMENT PLAN 11/13/2017 ADMISSION [facility]                                  61 m PMH Htn HLD Gout former smoker all lovastatin cipro pnc amox PMH Parotiditis  sciatica bph sue erectile dysfn hld obesity   Patient  admitted [facility] 11/13/2017 with chest congestion RTI and uncontrolled hypertension and was found to have elevated cardiac enzymes                                                   Hyoertension was controlled using ACEI Hydralazine and BB He was ruled out for vte  (reich done as DDimer was sl high) Patient was found to have RSV RTI which  Rxed with supp care plus azithro for poss superimposed bact infection Alk Phos was elevated andAc hep panel and US abd were unremarkab COPD was Rxed with BD On  11/14 Cardio Dr FLOWER felt that C enz do not meet criteria for NSTEMI   On 11/15 Dr Azar felt that elevated troponin was sec demand ischemia due to htn urgency DC pending cardio clearance ADRENAL MASS  CTACh 11/13  3.1 cm R adr mass with low attenuation cw adenoma To be FU as outpt Chart reviewed: Assessment plan is as below Note will be updated as more  patient data is gathered   Patient seen /examined/evaluated  today Plan/Questions explained (to patient/ancillary staff/colleagues) Chart notes reviewd     REVIEW OF SYMPTOMS  Gets fits of cough periodically Quit smoking 25 y   Able to give ROS  Yes    RELIABLE YES  Weakness Yes  Chills No Vision changes No  Endocrine No unexplained hair loss No heat or cold intolerance  Allergy No hives  Sore throat No   Coughing blood No Headache No Confusion No Chest pain No Palpitations No Pain abdomen NO   Shortness of breath YES Vomiting NO Pain neck No Pain abdomen NO   PHYSICAL EXAM    HEENT Unremarkable PERRLA atraumatic RESP Fair air entry EXP prolonged    Harsh breath sound Resp distres mild CARDIAC S1 S2 No S3     NO JVD  ABDOMEN SOFT BS PRESENT NOT DISTENDED No hepatosplenomegaly PEDAL EDEMA present No calf tenderness  NO rash GENERAL Not TOXIC looking Awake A & O x 3     VITALS/LABS  11/16/2017 afeb 76 150/90   11/16/2017 W 8.1 Hb 13.3 Plt 151 Na 144 K 3.4 CO2 27 Cr .9   11/16 Mg K replaced   PATIENT DESCRIPTION HPI INITIAL PROBLEMS AND PLAN11/13/2017 ADMISSION [facility]                                  61 m PMH Htn HLD Gout former smoker (quit 25 y)  all lovastatin cipro pnc amox PMH Parotiditis  sciatica bph sue erectile dysfn hld obesity   MEDS lisinopril 20 tamsulosin .4 atorvastat 10   Patient  had been having chest congestion and upper resp symptoms x last 3-4 d and in office was noted to have  D 104 and was sent to [facility] ER r hypertensive urgency ro poneumonia admitted 11/13/2017 Pt given clonidine .2 in [facility] office and given duoneb.2 transit                                                                                  HOSPITAL COURSE PLAN  11/13/2017 ADMISSION [facility]     Pt had elevated BP and elevated C enz suggesting hypertensive urgency  Hyoertension was controlled using ACEI Hydralazine and BB He was ruled out for vte as DDimer was sl high on arrival and was found to have RSV RTI and was Rxed with supp care plus azithro for poss superimposed bact infection Alk Phos was elevated and reich was started   PROBLEM ASSESSMENT PLAN                                                                                                 RESP DISTRESS  Managed with suppl O2   VTE   11/13 Ruled out Neg CTA and Neg V Duplex   11/13/2017 Cl susp was  mod to low   RO PNEUMONIA  11/13 RVP showed RSV  On 11/13 pct  was .12   Rxed with Azithro (11/13) for poss superimposed bact infectn   COPD   Managed with IBD ICS   RO MI  Initial C enz were positive   11/13-11/14-11/15/2017 CK [id number] Tr 1 .120 (i) - .108 (i) - .127 (i)    FD lvnx started 11/13 was changed to prophylactic dose 11/14 by PMD   Managed with nitrates statins BB acei   On  11/14 Cardio Dr FLOWER felt that C enz do not meet criteria for NSTEMI  On 11/15 Dr Azar felt that elevated troponin was sec demand ischemia due to htn urgency  HYPERTENSIVE URGENCY  Managed with Hydralazine 5 v.6p (11/14)  Lisinopril 40 (11/14) Metoprolol 25.2 (11/14)   RO CHF   ECHO  ordered on 11/13   ELEVATED ALK PHOS   11/15 Hep ABC n    11/15 US Abd No ac finding  ADRENAL MASS   CTACh 11/13  3.1 cm R adr mass with low attenuation cw adenoma To be FU as outpt  GLOBAL ISSUE/BEST PRACTICE:        PROBLEM:      Analgesia:     na                        PROBLEM: Sedation:     na               PROBLEM: HOB elevation:   y             PROBLEM: Stress ulcer proph:    na                      PROBLEM: VTE prophylaxis:      Lovenox 40 (11/14)                   PROBLEM: Glycemic control:    na   PROBLEM: Nutrition:    SIMEON (11/14)           PROBLEM: Advanced directive: na     PROBLEM: Allergies:  na    TIME SPENT Over 25 minutes aggregate care time spent on encounter; activities included   direct patient care, counseling and/or coordinating care reviewing notes, lab data/ imaging , discussion with multidisciplinary team/ patient  /family. Risks, benefits, alternatives  discussed in detail. Questions/concerns  were addressed  to the best of my ability .  SUMMARY ASSESSMENT PLAN 11/13/2017 ADMISSION [facility]                                  61 m PMH Htn HLD Gout former smoker all lovastatin cipro pnc amox PMH Parotiditis  sciatica bph sue erectile dysfn hld obesity   Patient  admitted [facility] 11/13/2017 with chest congestion RTI and uncontrolled hypertension and was found to have elevated cardiac enzymes                                                   Hyoertension was controlled using ACEI Hydralazine and BB He was ruled out for vte  (reich done as DDimer was sl high) Patient was found to have RSV RTI which  Rxed with supp care plus azithro for poss superimposed bact infection Alk Phos was elevated andAc hep panel and US abd were unremarkab COPD was Rxed with BD On  11/14 Cardio Dr FLOWER felt that C enz do not meet criteria for NSTEMI   On 11/15 Dr Azar felt that elevated troponin was sec demand ischemia due to htn urgency DC pending cardio clearance ADRENAL MASS  CTACh 11/13  3.1 cm R adr mass with low attenuation cw adenoma To be FU as outpt

## 2017-11-16 NOTE — DISCHARGE NOTE ADULT - CARE PLAN
Principal Discharge DX:	Viral syndrome  Goal:	resolution of cough  Instructions for follow-up, activity and diet:	OTC antitussives prn ,tylenol prn Encourage po fluids  Followup with PCP in 1 weeks  Secondary Diagnosis:	Elevated troponin level  Instructions for follow-up, activity and diet:	Patient refused workup and angiogramm,followup win card office within 7-10 days to schedule further testing is recommneded  Secondary Diagnosis:	Adrenal adenoma  Instructions for follow-up, activity and diet:	FOLLOWUP WITH ENDOCRINOLOGIST  in 1-2 weeks for further testing -check plasma renin activity, serum aldosterone  check plasma fractionated metanephrines  midnight salivary cortisol level  pt to f/u with endocrinologist, Lori Black  Secondary Diagnosis:	HTN (hypertension)  Instructions for follow-up, activity and diet:	BP monitoring,continue current antihypertensive meds, low salt diet,followup with PMD  Secondary Diagnosis:	High cholesterol  Instructions for follow-up, activity and diet:	continue current managmnet and medications  Secondary Diagnosis:	Rhabdomyolysis  Instructions for follow-up, activity and diet:	encourage po fluids  Secondary Diagnosis:	Need for prophylactic measure  Instructions for follow-up, activity and diet:	continue current managmnet and medications

## 2017-11-16 NOTE — DISCHARGE NOTE ADULT - PATIENT PORTAL LINK FT
“You can access the [employer] Patient Portal, offered by [facility], by registering with the following website: [email or web address]”

## 2017-11-16 NOTE — DISCHARGE NOTE ADULT - CARE PROVIDER_API CALL
Rio Nunn), Critical Care Medicine; Internal Medicine; Pulmonary Disease  [address] [place] [address]  Phone: [phone]  Fax: [phone]    Ruddy Bynum), Cardiology Cardiology  [facility]  [address] [place] [address]  Phone: [phone]  Fax: [phone]

## 2017-11-16 NOTE — PROGRESS NOTE ADULT - SUBJECTIVE AND OBJECTIVE BOX
PROGRESS NOTE  Patient is a 61y old  Male who presents with a chief complaint of     OVERNIGHT      HPI:  Patient is a 60 yo male with pmhx of HTN, HLD, Gout presenting with shortness of breath. Patient stated that he has cold like symptoms with chest congestion and cough for about 3-4 days, went to his PMD today for evaluation and was told that he has elevated blood pressure and was sent to the ED. Patient also complain of SOB for over 1 year which worsened in the past few days. Patient complains of JUAREZ, whenever he walks long distance or up stairs. Stated that he sleeps on about 5 pillows every night. Patient also admits to occasional b/l leg swelling. Stated that his chest pain started about 3-4 days ago and worsens whenever he coughs. Denies any sick contact, stated that he received his flu shot about 2 months ago. Patient denies any hx of CHF, or heart disease. Denies fever, chills, palpitations, abdominal pain, nausea, vomiting, diarrhea, constipation, urinary frequency, urgency, or dysuria, headaches, changes in vision, dizziness, numbness, or tingling.    In the ED, is afebrile, 75bpm, 182/108, RR16 @97% room air. WBC 7.3, h/h 15.2/51.6. K 3.3, BUN 21, Cr 1.3. Lactate 1.3, Ddimer 382, alk phos 206, AST 52, CK 1583, CKMB 4.7, Troponin 0.12, ProBNP 3858. CXR was negative for infiltrates or effusion. EKG showed sinus rhythm, RBBB, left anterior fascicular block, bifascular block, left ventricular hypertrophy. (14 Nov 2017 00:05)    PAST MEDICAL & SURGICAL HISTORY:  High cholesterol  HTN (hypertension)  Gout  H/O elbow surgery  History of hip surgery      MEDICATIONS  (STANDING):  ALBUTerol/ipratropium for Nebulization 3 milliLiter(s) Nebulizer every 6 hours  amLODIPine   Tablet 10 milliGRAM(s) Oral daily  aspirin  chewable 81 milliGRAM(s) Oral daily  atorvastatin 10 milliGRAM(s) Oral at bedtime  azithromycin   Tablet 500 milliGRAM(s) Oral every 24 hours  buDESOnide   0.5 milliGRAM(s) Respule 0.5 milliGRAM(s) Inhalation two times a day  colchicine 0.6 milliGRAM(s) Oral daily  dextrose 5% + sodium chloride 0.45% with potassium chloride 20 mEq/L 1000 milliLiter(s) (50 mL/Hr) IV Continuous <Continuous>  enoxaparin Injectable 40 milliGRAM(s) SubCutaneous every 24 hours  guaiFENesin/dextromethorphan  Syrup 10 milliLiter(s) Oral four times a day  lisinopril 40 milliGRAM(s) Oral daily  magnesium oxide 400 milliGRAM(s) Oral three times a day with meals  metoprolol     tartrate 25 milliGRAM(s) Oral two times a day  potassium chloride    Tablet ER 20 milliEquivalent(s) Oral every 2 hours    MEDICATIONS  (PRN):  benzonatate 100 milliGRAM(s) Oral three times a day PRN Cough  hydrALAZINE Injectable 5 milliGRAM(s) IV Push every 4 hours PRN for systolic BP>160 or diastolic BP >100 and call dr Bynum [phone]      OBJECTIVE    T(C): 36.3 (11-16-17 @ 07:45), Max: 36.7 (11-15-17 @ 20:25)  HR: 76 (11-16-17 @ 08:08) (69 - 84)  BP: 142/94 (11-16-17 @ 07:45) (142/94 - 165/97)  RR: 17 (11-16-17 @ 07:45) (17 - 18)  SpO2: 100% (11-16-17 @ 08:08) (98% - 100%)  Wt(kg): --  I&O's Summary    15 Nov 2017 07:01  -  16 Nov 2017 07:00  --------------------------------------------------------  IN: 1860 mL / OUT: 0 mL / NET: 1860 mL    16 Nov 2017 07:01  -  16 Nov 2017 11:11  --------------------------------------------------------  IN: 200 mL / OUT: 0 mL / NET: 200 mL          REVIEW OF SYSTEMS:  CONSTITUTIONAL: No fever, weight loss, or fatigue  EYES: No eye pain, visual disturbances, or discharge  ENMT:   No sinus or throat pain  NECK: No pain or stiffness  RESPIRATORY: No cough, wheezing, chills or hemoptysis; No shortness of breath  CARDIOVASCULAR: No chest pain, palpitations, dizziness, or leg swelling  GASTROINTESTINAL: No abdominal pain. No nausea, vomiting; No diarrhea or constipation. No melena or hematochezia.  GENITOURINARY: No dysuria, frequency, hematuria, or incontinence  NEUROLOGICAL: No headaches, memory loss, loss of strength, numbness, or tremors  SKIN: No itching, burning, rashes, or lesions   MUSCULOSKELETAL: No joint pain or swelling; No muscle, back, or extremity pain    PHYSICAL EXAM:  Appearance: NAD. VS past 24 hrs -as above   HEENT:   Moist oral mucosa. Conjunctiva clear b/l.   Neck : supple  Respiratory: Lungs CTAB.  Gastrointestinal:  Soft, nontender. No rebound. No rigidity. BS present	  Cardiovascular: RRR ,S1S2 present  Neurologic: Non-focal. Moving all extremities.  Extremities: No edema. No erythema. No calf tenderness.  Skin: No rashes, No ecchymoses, No cyanosis.	  wounds ,skin lesions-See skin assesment flow sheet   LABS:                        13.3   8.1   )-----------( 151      ( 16 Nov 2017 06:05 )             44.6     11-16    144  |  108  |  16  ----------------------------<  95  3.4<L>   |  27  |  0.97    Ca    7.9<L>      16 Nov 2017 06:05  Phos  3.3     11-16  Mg     1.5     11-16    TPro  6.4  /  Alb  2.9<L>  /  TBili  0.6  /  DBili  x   /  AST  43<H>  /  ALT  46  /  AlkPhos  168<H>  11-16    CAPILLARY BLOOD GLUCOSE              Culture - Blood (collected 14 Nov 2017 12:04)  Source: .Blood Blood  Preliminary Report (15 Nov 2017 13:01):    No growth to date.    Culture - Urine (collected 14 Nov 2017 09:58)  Source: .Urine Clean Catch (Midstream)  Final Report (16 Nov 2017 10:59):    10,000 - 49,000 CFU/mL Citrobacter koseri  Organism: Citrobacter koseri (16 Nov 2017 10:59)  Organism: Citrobacter koseri (16 Nov 2017 10:59)    Culture - Blood (collected 14 Nov 2017 00:30)  Source: .Blood Blood-Peripheral  Preliminary Report (15 Nov 2017 01:03):    No growth to date.    Culture - Blood (collected 14 Nov 2017 00:30)  Source: .Blood Blood-Peripheral  Preliminary Report (15 Nov 2017 01:03):    No growth to date.      RADIOLOGY & ADDITIONAL TESTS:     ASSESSMENT/PLAN: PROGRESS NOTE  Patient is a 61y old  Male who presents with a chief complaint of     OVERNIGHT  No new issues reported by medical staff . All above noted Patient requesting to d/c him home and refusing further workup and angiogramm ,which was offered by card toshia Azar 11/15 Awaitng for card followup and clearance for d/c today Seen by endocr and outpatient workup of adrenal adenoma was advised. Plan of care was d/w Dr Nunn ,outpatient PCP on a phone and he is in agreement with plan   Patient plans to follow with card ,endocr and pcp in [facility] clinic within next 7-10 days   HPI:  Patient is a 62 yo male with pmhx of HTN, HLD, Gout presenting with shortness of breath. Patient stated that he has cold like symptoms with chest congestion and cough for about 3-4 days, went to his PMD today for evaluation and was told that he has elevated blood pressure and was sent to the ED. Patient also complain of SOB for over 1 year which worsened in the past few days. Patient complains of JUAREZ, whenever he walks long distance or up stairs. Stated that he sleeps on about 5 pillows every night. Patient also admits to occasional b/l leg swelling. Stated that his chest pain started about 3-4 days ago and worsens whenever he coughs. Denies any sick contact, stated that he received his flu shot about 2 months ago. Patient denies any hx of CHF, or heart disease. Denies fever, chills, palpitations, abdominal pain, nausea, vomiting, diarrhea, constipation, urinary frequency, urgency, or dysuria, headaches, changes in vision, dizziness, numbness, or tingling.    In the ED, is afebrile, 75bpm, 182/108, RR16 @97% room air. WBC 7.3, h/h 15.2/51.6. K 3.3, BUN 21, Cr 1.3. Lactate 1.3, Ddimer 382, alk phos 206, AST 52, CK 1583, CKMB 4.7, Troponin 0.12, ProBNP 3858. CXR was negative for infiltrates or effusion. EKG showed sinus rhythm, RBBB, left anterior fascicular block, bifascular block, left ventricular hypertrophy. (14 Nov 2017 00:05)    PAST MEDICAL & SURGICAL HISTORY:  High cholesterol  HTN (hypertension)  Gout  H/O elbow surgery  History of hip surgery      MEDICATIONS  (STANDING):  ALBUTerol/ipratropium for Nebulization 3 milliLiter(s) Nebulizer every 6 hours  amLODIPine   Tablet 10 milliGRAM(s) Oral daily  aspirin  chewable 81 milliGRAM(s) Oral daily  atorvastatin 10 milliGRAM(s) Oral at bedtime  azithromycin   Tablet 500 milliGRAM(s) Oral every 24 hours  buDESOnide   0.5 milliGRAM(s) Respule 0.5 milliGRAM(s) Inhalation two times a day  colchicine 0.6 milliGRAM(s) Oral daily  dextrose 5% + sodium chloride 0.45% with potassium chloride 20 mEq/L 1000 milliLiter(s) (50 mL/Hr) IV Continuous <Continuous>  enoxaparin Injectable 40 milliGRAM(s) SubCutaneous every 24 hours  guaiFENesin/dextromethorphan  Syrup 10 milliLiter(s) Oral four times a day  lisinopril 40 milliGRAM(s) Oral daily  magnesium oxide 400 milliGRAM(s) Oral three times a day with meals  metoprolol     tartrate 25 milliGRAM(s) Oral two times a day  potassium chloride    Tablet ER 20 milliEquivalent(s) Oral every 2 hours    MEDICATIONS  (PRN):  benzonatate 100 milliGRAM(s) Oral three times a day PRN Cough  hydrALAZINE Injectable 5 milliGRAM(s) IV Push every 4 hours PRN for systolic BP>160 or diastolic BP >100 and call dr Bynum [phone]      OBJECTIVE    T(C): 36.3 (11-16-17 @ 07:45), Max: 36.7 (11-15-17 @ 20:25)  HR: 76 (11-16-17 @ 08:08) (69 - 84)  BP: 142/94 (11-16-17 @ 07:45) (142/94 - 165/97)  RR: 17 (11-16-17 @ 07:45) (17 - 18)  SpO2: 100% (11-16-17 @ 08:08) (98% - 100%)  Wt(kg): --  I&O's Summary    15 Nov 2017 07:01  -  16 Nov 2017 07:00  --------------------------------------------------------  IN: 1860 mL / OUT: 0 mL / NET: 1860 mL    16 Nov 2017 07:01  -  16 Nov 2017 11:11  --------------------------------------------------------  IN: 200 mL / OUT: 0 mL / NET: 200 mL            LABS:                        13.3   8.1   )-----------( 151      ( 16 Nov 2017 06:05 )         REVIEW OF SYSTEMS: denies CP, SOB , PALPITATIONS , DIZZINESS, LIGHTHEADEDNESS   CONSTITUTIONAL: No fever, weight loss, complains of generalised  weakness   EYES: No eye pain, visual disturbances, or discharge  ENMT:   No sinus or throat pain  NECK: No pain or stiffness  RESPIRATORY:  cough is improving , JUAREZ resolved Ambulates in a hallway   CARDIOVASCULAR: No chest pain, palpitations, dizziness  GASTROINTESTINAL: No abdominal pain. No nausea, vomiting; No diarrhea or constipation. No melena or hematochezia.  GENITOURINARY: No dysuria, frequency, hematuria, or incontinence  NEUROLOGICAL: No headaches, memory loss, loss of strength, numbness, or tremors  SKIN: WNL  MUSCULOSKELETAL: ankle /feet pitting  edema ,chronic as per patient     PHYSICAL EXAM:  Appearance: NAD. VS past 24 hrs -as above   HEENT:   Moist oral mucosa. Conjunctiva clear b/l.   Neck : supple  Respiratory: Lungs CTAB,no wheesing ,coarse bs   Gastrointestinal:  Soft, nontender. No rebound. No rigidity. BS present	  Cardiovascular: RRR ,S1S2 present   Neurologic: Non-focal. Moving all extremities.  Extremities: pitting  edema of b/l LE . No erythema. No calf tenderness.  Skin: No rashes, No ecchymoses, No cyanosis.      44.6     11-16    144  |  108  |  16  ----------------------------<  95  3.4<L>   |  27  |  0.97    Ca    7.9<L>      16 Nov 2017 06:05  Phos  3.3     11-16  Mg     1.5     11-16    TPro  6.4  /  Alb  2.9<L>  /  TBili  0.6  /  DBili  x   /  AST  43<H>  /  ALT  46  /  AlkPhos  168<H>  11-16    CAPILLARY BLOOD GLUCOSE              Culture - Blood (collected 14 Nov 2017 12:04)  Source: .Blood Blood  Preliminary Report (15 Nov 2017 13:01):    No growth to date.    Culture - Urine (collected 14 Nov 2017 09:58)  Source: .Urine Clean Catch (Midstream)  Final Report (16 Nov 2017 10:59):    10,000 - 49,000 CFU/mL Citrobacter koseri  Organism: Citrobacter koseri (16 Nov 2017 10:59)  Organism: Citrobacter koseri (16 Nov 2017 10:59)    Culture - Blood (collected 14 Nov 2017 00:30)  Source: .Blood Blood-Peripheral  Preliminary Report (15 Nov 2017 01:03):    No growth to date.    Culture - Blood (collected 14 Nov 2017 00:30)  Source: .Blood Blood-Peripheral  Preliminary Report (15 Nov 2017 01:03):    No growth to date.      RADIOLOGY & ADDITIONAL TESTS:   < from: US Abdomen Limited (11.15.17 @ 08:34) >  EXAM:  US ABDOMEN LIMITED                            PROCEDURE DATE:  11/15/2017          INTERPRETATION:  CLINICAL INFORMATION: Right upper quadrant pain    COMPARISON: None available.    TECHNIQUE: Sonography of the right upper quadrant.     FINDINGS:    Liver: Within normal limits.    Bile ducts: Normal caliber. Common bile duct measures 4 mm.     Gallbladder: Within normal limits.        Pancreas: Visualized portions are within normal limits.    Right kidney: 12 cm. No hydronephrosis.        Ascites: None.    IVC: Visualized portions are within normal limits.   There is a hypoechoic 3 x 3.3 cm right adrenal lesion corresponding to   CT abnormality statistically most likely representing a cortical adenoma.  IMPRESSION:     No gallbladderdisease or other acute diagnostic finding. Findings as     < end of copied text >    ASSESSMENT/PLAN:

## 2017-11-19 LAB
CULTURE RESULTS: SIGNIFICANT CHANGE UP
SPECIMEN SOURCE: SIGNIFICANT CHANGE UP

## 2020-11-22 NOTE — ED PROVIDER NOTE - SEVERITY
Patient alert and awake. KENNA in room. On IV fluids. K riders given. With intact peritoneal dialysis cath. 1100 ml of peritoneal fluids removed by Md at bedside. PRN meds for nausea given. All due meds given. Call light within. Bed alarm on. Will continue to monitor patient.    PAIN SCALE 3 OF 10.

## 2021-05-24 PROBLEM — E78.00 PURE HYPERCHOLESTEROLEMIA, UNSPECIFIED: Chronic | Status: ACTIVE | Noted: 2017-11-13

## 2021-05-24 PROBLEM — M10.9 GOUT, UNSPECIFIED: Chronic | Status: ACTIVE | Noted: 2017-11-13

## 2021-05-24 PROBLEM — I10 ESSENTIAL (PRIMARY) HYPERTENSION: Chronic | Status: ACTIVE | Noted: 2017-11-13

## 2021-06-06 NOTE — H&P ADULT - ATTENDING COMMENTS
oral
Patient is a 60 yo male with pmhx of HTN, HLD, Gout presenting with shortness of breath, admit for respiratory distress, evaluate for new onset CHF  vs copd exacerbation vs PE.  Cardio consulted, echo ordered.  Continue zithromax in light of possible copd exacerbation.  Full dose Lovenox given 2/2 elevated troponins with elevated BP.  Hypertensive Urgency. evaluate for ACS.  Plavix, asa, and full dose Lovenox given.  F/U Cardio recs.

## 2021-06-08 NOTE — ED PROVIDER NOTE - NS ED MD DISPO SPECIAL CONSIDERATION1
No. EDMOND screening performed.  STOP BANG Legend: 0-2 = LOW Risk; 3-4 = INTERMEDIATE Risk; 5-8 = HIGH Risk None

## 2021-07-01 ENCOUNTER — APPOINTMENT (OUTPATIENT)
Dept: RHEUMATOLOGY | Facility: CLINIC | Age: 66
End: 2021-07-01

## 2021-12-30 ENCOUNTER — APPOINTMENT (OUTPATIENT)
Dept: RHEUMATOLOGY | Facility: CLINIC | Age: 66
End: 2021-12-30

## 2022-08-08 ENCOUNTER — APPOINTMENT (OUTPATIENT)
Dept: ORTHOPEDIC SURGERY | Facility: CLINIC | Age: 67
End: 2022-08-08
Payer: COMMERCIAL

## 2022-08-08 VITALS — WEIGHT: 234 LBS | HEIGHT: 70.5 IN | BODY MASS INDEX: 33.13 KG/M2

## 2022-08-08 DIAGNOSIS — G56.02 CARPAL TUNNEL SYNDROME, LEFT UPPER LIMB: ICD-10-CM

## 2022-08-08 DIAGNOSIS — G56.01 CARPAL TUNNEL SYNDROME, RIGHT UPPER LIMB: ICD-10-CM

## 2022-08-08 DIAGNOSIS — E78.00 PURE HYPERCHOLESTEROLEMIA, UNSPECIFIED: ICD-10-CM

## 2022-08-08 DIAGNOSIS — I10 ESSENTIAL (PRIMARY) HYPERTENSION: ICD-10-CM

## 2022-08-08 PROCEDURE — 99203 OFFICE O/P NEW LOW 30 MIN: CPT | Mod: 57

## 2022-08-09 PROBLEM — G56.02 CARPAL TUNNEL SYNDROME OF LEFT WRIST: Status: ACTIVE | Noted: 2022-08-09

## 2022-08-09 PROBLEM — G56.01 CARPAL TUNNEL SYNDROME OF RIGHT WRIST: Status: ACTIVE | Noted: 2022-08-09

## 2022-08-09 NOTE — ASSESSMENT
[FreeTextEntry1] : For L CTR and excision MF mass\par R/B/A of surgery discussed with the patient. Risks including but not limited to infection, nerve damage, tendon damage, pain, stiffness, recurrence, no resolution of symptoms, loss of function, limb or life. They understand and agree to surgery \par Return post op

## 2022-08-09 NOTE — PHYSICAL EXAM
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

## 2022-08-09 NOTE — HISTORY OF PRESENT ILLNESS
[Gradual] : gradual [3] : 3 [1] : 2 [Dull/Aching] : dull/aching [Nothing helps with pain getting better] : Nothing helps with pain getting better [de-identified] : He has large gouty tophi R elbow, L MF\par He has CTS which is getting worse\par Had EMG with Neuro [] : no [FreeTextEntry1] : right elbow/ left hand  [FreeTextEntry3] : few years  [FreeTextEntry5] : he is having and swelling, no injury  [de-identified] : activity  [de-identified] : few weeks ago  [de-identified] : PCP

## 2022-09-07 ENCOUNTER — APPOINTMENT (OUTPATIENT)
Age: 67
End: 2022-09-07

## 2022-09-15 ENCOUNTER — APPOINTMENT (OUTPATIENT)
Dept: ORTHOPEDIC SURGERY | Facility: CLINIC | Age: 67
End: 2022-09-15

## 2022-09-23 RX ORDER — HYDROCODONE BITARTRATE AND ACETAMINOPHEN 5; 325 MG/1; MG/1
5-325 TABLET ORAL EVERY 4 HOURS
Qty: 5 | Refills: 0 | Status: ACTIVE | COMMUNITY
Start: 2022-09-23 | End: 1900-01-01

## 2022-09-28 ENCOUNTER — RESULT REVIEW (OUTPATIENT)
Age: 67
End: 2022-09-28

## 2022-09-28 ENCOUNTER — APPOINTMENT (OUTPATIENT)
Age: 67
End: 2022-09-28

## 2022-09-28 PROCEDURE — 26160 REMOVE TENDON SHEATH LESION: CPT | Mod: AS,F2

## 2022-09-28 PROCEDURE — 26160 REMOVE TENDON SHEATH LESION: CPT | Mod: F2

## 2022-09-29 RX ORDER — HYDROCODONE BITARTRATE AND ACETAMINOPHEN 5; 325 MG/1; MG/1
5-325 TABLET ORAL EVERY 4 HOURS
Qty: 10 | Refills: 0 | Status: ACTIVE | COMMUNITY
Start: 2022-09-29 | End: 1900-01-01

## 2022-10-06 ENCOUNTER — APPOINTMENT (OUTPATIENT)
Dept: ORTHOPEDIC SURGERY | Facility: CLINIC | Age: 67
End: 2022-10-06

## 2022-10-06 VITALS — WEIGHT: 234 LBS | BODY MASS INDEX: 33.13 KG/M2 | HEIGHT: 70.5 IN

## 2022-10-06 PROCEDURE — 99024 POSTOP FOLLOW-UP VISIT: CPT

## 2022-10-07 NOTE — PHYSICAL EXAM
[de-identified] : Mild hand swelling\par Healed incision\par No evidence of infection\par Mild tenderness at the surgical site\par Good finger ROM \par Swelling\par No mass

## 2022-10-07 NOTE — HISTORY OF PRESENT ILLNESS
[3] : 3 [2] : 2 [Dull/Aching] : dull/aching [] : Post Surgical Visit: yes [de-identified] : L MF exc gouty tophus\par he is doing well [FreeTextEntry1] : left hand/ MF [FreeTextEntry6] : numbness [de-identified] : no [de-identified] : 9/28/22 [de-identified] : Left CTR and excision MF mass

## 2022-10-07 NOTE — ASSESSMENT
[FreeTextEntry1] : Sutures removed\par Steris applied\par Splint applied\par OT\par Return in 2 weeks\par Pain control

## 2022-10-20 ENCOUNTER — APPOINTMENT (OUTPATIENT)
Dept: ORTHOPEDIC SURGERY | Facility: CLINIC | Age: 67
End: 2022-10-20

## 2022-10-20 VITALS — WEIGHT: 234 LBS | HEIGHT: 70 IN | BODY MASS INDEX: 33.5 KG/M2

## 2022-10-20 PROCEDURE — 99024 POSTOP FOLLOW-UP VISIT: CPT

## 2022-10-20 RX ORDER — OXYCODONE AND ACETAMINOPHEN 5; 325 MG/1; MG/1
5-325 TABLET ORAL
Qty: 20 | Refills: 0 | Status: ACTIVE | COMMUNITY
Start: 2022-10-20 | End: 1900-01-01

## 2022-10-24 NOTE — HISTORY OF PRESENT ILLNESS
[7] : 7 [Dull/Aching] : dull/aching [Tingling] : tingling [] : Post Surgical Visit: yes [de-identified] : L MF exc gout\par He is feeling better- still has some pain  [FreeTextEntry1] : L MF [de-identified] : ice,tape [de-identified] : 9/28/22 [de-identified] : left exc MF mass

## 2022-10-24 NOTE — PHYSICAL EXAM
[de-identified] : Mild hand swelling\par Healed incision\par No evidence of infection\par Mild tenderness at the surgical site\par Finger has limited ext \par Swelling\par No mass

## 2022-11-15 ENCOUNTER — APPOINTMENT (OUTPATIENT)
Dept: ORTHOPEDIC SURGERY | Facility: CLINIC | Age: 67
End: 2022-11-15

## 2022-11-28 ENCOUNTER — APPOINTMENT (OUTPATIENT)
Dept: ORTHOPEDIC SURGERY | Facility: CLINIC | Age: 67
End: 2022-11-28

## 2022-11-28 VITALS — WEIGHT: 234 LBS | HEIGHT: 70 IN | BODY MASS INDEX: 33.5 KG/M2

## 2022-11-28 PROCEDURE — 99024 POSTOP FOLLOW-UP VISIT: CPT

## 2022-11-28 NOTE — HISTORY OF PRESENT ILLNESS
[9] : 9 [4] : 4 [Dull/Aching] : dull/aching [] : Post Surgical Visit: yes [de-identified] : L MF cannot ext actively [FreeTextEntry1] : L MF [de-identified] : therapy,pain meds [de-identified] : 9/28/22 [de-identified] : left exc MF mass

## 2023-02-13 ENCOUNTER — APPOINTMENT (OUTPATIENT)
Dept: ORTHOPEDIC SURGERY | Facility: CLINIC | Age: 68
End: 2023-02-13
Payer: COMMERCIAL

## 2023-02-13 VITALS — HEIGHT: 70 IN | BODY MASS INDEX: 33.5 KG/M2 | WEIGHT: 234 LBS

## 2023-02-13 PROCEDURE — 99214 OFFICE O/P EST MOD 30 MIN: CPT | Mod: 57

## 2023-02-13 RX ORDER — OXYCODONE AND ACETAMINOPHEN 5; 325 MG/1; MG/1
5-325 TABLET ORAL
Qty: 20 | Refills: 0 | Status: ACTIVE | COMMUNITY
Start: 2022-10-06 | End: 1900-01-01

## 2023-02-13 NOTE — ASSESSMENT
[FreeTextEntry1] : I rec L MF PIP fusion\par R/B/A of surgery discussed with the patient. Risks including but not limited to infection, nerve damage, tendon damage, pain, stiffness, recurrence, no resolution of symptoms, arthrits, malunion, nonunion, loss of function, limb or life. They understand and agree to surgery \par Return post op

## 2023-02-13 NOTE — HISTORY OF PRESENT ILLNESS
[5] : 5 [4] : 4 [Dull/Aching] : dull/aching [de-identified] : L MF cannot extend\par \par MRI shows severe gouty OA, central slip tearing  [FreeTextEntry1] : left MF [de-identified] : none

## 2023-02-16 ENCOUNTER — FORM ENCOUNTER (OUTPATIENT)
Age: 68
End: 2023-02-16

## 2023-03-06 RX ORDER — OXYCODONE AND ACETAMINOPHEN 5; 325 MG/1; MG/1
5-325 TABLET ORAL
Qty: 20 | Refills: 0 | Status: ACTIVE | COMMUNITY
Start: 2023-03-06 | End: 1900-01-01

## 2023-03-08 ENCOUNTER — FORM ENCOUNTER (OUTPATIENT)
Age: 68
End: 2023-03-08

## 2023-03-16 ENCOUNTER — APPOINTMENT (OUTPATIENT)
Dept: ORTHOPEDIC SURGERY | Facility: CLINIC | Age: 68
End: 2023-03-16

## 2023-03-29 ENCOUNTER — APPOINTMENT (OUTPATIENT)
Age: 68
End: 2023-03-29
Payer: COMMERCIAL

## 2023-03-29 PROCEDURE — 26860 FUSION OF FINGER JOINT: CPT | Mod: AS,F2

## 2023-03-29 PROCEDURE — 26860 FUSION OF FINGER JOINT: CPT | Mod: F2

## 2023-03-30 ENCOUNTER — APPOINTMENT (OUTPATIENT)
Dept: ORTHOPEDIC SURGERY | Facility: CLINIC | Age: 68
End: 2023-03-30
Payer: COMMERCIAL

## 2023-03-30 VITALS — BODY MASS INDEX: 33.5 KG/M2 | HEIGHT: 70 IN | WEIGHT: 234 LBS

## 2023-03-30 DIAGNOSIS — R22.32 LOCALIZED SWELLING, MASS AND LUMP, LEFT UPPER LIMB: ICD-10-CM

## 2023-03-30 PROCEDURE — 99024 POSTOP FOLLOW-UP VISIT: CPT

## 2023-03-30 RX ORDER — OXYCODONE AND ACETAMINOPHEN 5; 325 MG/1; MG/1
5-325 TABLET ORAL
Qty: 20 | Refills: 0 | Status: ACTIVE | COMMUNITY
Start: 2023-03-27 | End: 1900-01-01

## 2023-03-30 NOTE — HISTORY OF PRESENT ILLNESS
[5] : 5 [4] : 4 [Dull/Aching] : dull/aching [] : Post Surgical Visit: yes [de-identified] : L MF PIP fusion yesterday\par Has bleeding  [FreeTextEntry1] : l mf [de-identified] : 3/29/23 [de-identified] : left MF PIP fusion

## 2023-03-30 NOTE — PHYSICAL EXAM
[de-identified] : Mild hand swelling\par incision oozing blood\par No evidence of infection\par Mild tenderness at the surgical site\par Good finger ROM

## 2023-04-04 ENCOUNTER — APPOINTMENT (OUTPATIENT)
Dept: ORTHOPEDIC SURGERY | Facility: CLINIC | Age: 68
End: 2023-04-04
Payer: COMMERCIAL

## 2023-04-04 VITALS — HEIGHT: 70 IN | BODY MASS INDEX: 33.5 KG/M2 | WEIGHT: 234 LBS

## 2023-04-04 PROCEDURE — 99024 POSTOP FOLLOW-UP VISIT: CPT

## 2023-04-04 PROCEDURE — 73140 X-RAY EXAM OF FINGER(S): CPT | Mod: LT

## 2023-04-04 NOTE — HISTORY OF PRESENT ILLNESS
[8] : 8 [] : Post Surgical Visit: yes [de-identified] : L MF still has pain \par \par L MF PIP fusion last week 1 week ago [FreeTextEntry1] : L MF [de-identified] : pain meds [de-identified] : 3/29/23 [de-identified] : left MF PIP fusion

## 2023-04-04 NOTE — ASSESSMENT
[FreeTextEntry1] : Sutures removed\par Steris appllied\par LIght activities and advance as tolerated\par Pain control\par Return 2 weeks- xrays

## 2023-04-04 NOTE — PHYSICAL EXAM
[de-identified] : Mild hand swelling\par incision oozing blood\par No evidence of infection\par Mild tenderness at the surgical site\par Swelling MF\par No motion at the MF PIP\par \par Xrays well aligned; hardware in place; healing fusion site

## 2023-04-13 ENCOUNTER — FORM ENCOUNTER (OUTPATIENT)
Age: 68
End: 2023-04-13

## 2023-04-24 ENCOUNTER — APPOINTMENT (OUTPATIENT)
Dept: ORTHOPEDIC SURGERY | Facility: CLINIC | Age: 68
End: 2023-04-24
Payer: COMMERCIAL

## 2023-04-24 VITALS — HEIGHT: 70 IN | WEIGHT: 234 LBS | BODY MASS INDEX: 33.5 KG/M2

## 2023-04-24 PROCEDURE — 73140 X-RAY EXAM OF FINGER(S): CPT | Mod: LT

## 2023-04-24 PROCEDURE — 99024 POSTOP FOLLOW-UP VISIT: CPT

## 2023-04-24 RX ORDER — AMOXICILLIN AND CLAVULANATE POTASSIUM 875; 125 MG/1; MG/1
875-125 TABLET, COATED ORAL TWICE DAILY
Qty: 14 | Refills: 0 | Status: ACTIVE | COMMUNITY
Start: 2023-04-24 | End: 1900-01-01

## 2023-04-24 RX ORDER — METHYLPREDNISOLONE 4 MG/1
4 TABLET ORAL
Qty: 1 | Refills: 0 | Status: ACTIVE | COMMUNITY
Start: 2023-04-24 | End: 1900-01-01

## 2023-04-24 NOTE — PHYSICAL EXAM
[de-identified] : Mild hand swelling\par incision oozing blood\par No evidence of infection\par Mild tenderness at the surgical site\par Swelling MF\par No motion at the MF PIP\par Hand swelling\par \par Xrays well aligned; hardware in place; healing fusion site

## 2023-04-24 NOTE — HISTORY OF PRESENT ILLNESS
[10] : 10 [Dull/Aching] : dull/aching [] : Post Surgical Visit: yes [de-identified] : L hand is swelling\par Painful\par Started recently getting worse [FreeTextEntry1] : L MF [de-identified] : pain meds,HEP [de-identified] : 3/29/23 [de-identified] : left MF PIP fusion

## 2023-05-01 ENCOUNTER — APPOINTMENT (OUTPATIENT)
Dept: ORTHOPEDIC SURGERY | Facility: CLINIC | Age: 68
End: 2023-05-01
Payer: COMMERCIAL

## 2023-05-01 ENCOUNTER — NON-APPOINTMENT (OUTPATIENT)
Age: 68
End: 2023-05-01

## 2023-05-01 VITALS — WEIGHT: 234 LBS | BODY MASS INDEX: 33.5 KG/M2 | HEIGHT: 70 IN

## 2023-05-01 PROCEDURE — 99024 POSTOP FOLLOW-UP VISIT: CPT

## 2023-05-01 NOTE — HISTORY OF PRESENT ILLNESS
[6] : 6 [5] : 5 [Dull/Aching] : dull/aching [] : Post Surgical Visit: yes [de-identified] : L MF is much better  [FreeTextEntry1] : L MF [de-identified] : meds [de-identified] : 3/29/23 [de-identified] : left MF PIP fusion

## 2023-05-22 ENCOUNTER — APPOINTMENT (OUTPATIENT)
Dept: ORTHOPEDIC SURGERY | Facility: CLINIC | Age: 68
End: 2023-05-22
Payer: COMMERCIAL

## 2023-05-22 VITALS — BODY MASS INDEX: 33.5 KG/M2 | WEIGHT: 234 LBS | HEIGHT: 70 IN

## 2023-05-22 DIAGNOSIS — M19.042 PRIMARY OSTEOARTHRITIS, LEFT HAND: ICD-10-CM

## 2023-05-22 PROCEDURE — 99213 OFFICE O/P EST LOW 20 MIN: CPT | Mod: 24,57

## 2023-05-22 RX ORDER — OXYCODONE AND ACETAMINOPHEN 10; 325 MG/1; MG/1
10-325 TABLET ORAL
Qty: 20 | Refills: 0 | Status: ACTIVE | COMMUNITY
Start: 2023-04-04 | End: 1900-01-01

## 2023-05-22 NOTE — HISTORY OF PRESENT ILLNESS
[9] : 9 [8] : 8 [] : Post Surgical Visit: yes [de-identified] : L MF still has pain \par \par R elbow mass\par  [FreeTextEntry1] : L MF [FreeTextEntry5] : Pain is better. Most prevalent at night.  [de-identified] : 3/29/23 [de-identified] : left MF PIP fusion

## 2023-05-22 NOTE — PHYSICAL EXAM
[de-identified] : R elbow \par Mass at the olec\par Nontender\par Mild stiffness\par \par L MF \par No motion at the PIP\par Swelling\par \par

## 2023-05-22 NOTE — ASSESSMENT
[FreeTextEntry1] : Pain control\par \par For R elbow excision mass\par R/B/A of surgery discussed with the patient. Risks including but not limited to infection, nerve damage, tendon damage, pain, stiffness, recurrence, no resolution of symptoms, loss of function, limb or life. They understand and agree to surgery \par Return post op

## 2023-09-06 ENCOUNTER — APPOINTMENT (OUTPATIENT)
Age: 68
End: 2023-09-06

## 2023-09-14 ENCOUNTER — APPOINTMENT (OUTPATIENT)
Dept: ORTHOPEDIC SURGERY | Facility: CLINIC | Age: 68
End: 2023-09-14

## 2023-10-18 ENCOUNTER — APPOINTMENT (OUTPATIENT)
Age: 68
End: 2023-10-18
Payer: COMMERCIAL

## 2023-10-18 PROCEDURE — 24075 EXC ARM/ELBOW LES SC < 3 CM: CPT | Mod: RT

## 2023-10-18 PROCEDURE — 24075 EXC ARM/ELBOW LES SC < 3 CM: CPT | Mod: AS,RT

## 2023-10-26 ENCOUNTER — APPOINTMENT (OUTPATIENT)
Dept: ORTHOPEDIC SURGERY | Facility: CLINIC | Age: 68
End: 2023-10-26
Payer: COMMERCIAL

## 2023-10-26 VITALS — WEIGHT: 234 LBS | HEIGHT: 71 IN | BODY MASS INDEX: 32.76 KG/M2

## 2023-10-26 DIAGNOSIS — M1A.09X1 IDIOPATHIC CHRONIC GOUT, MULTIPLE SITES, WITH TOPHUS (TOPHI): ICD-10-CM

## 2023-10-26 PROCEDURE — 99024 POSTOP FOLLOW-UP VISIT: CPT

## 2023-11-09 ENCOUNTER — APPOINTMENT (OUTPATIENT)
Dept: ORTHOPEDIC SURGERY | Facility: CLINIC | Age: 68
End: 2023-11-09
Payer: COMMERCIAL

## 2023-11-09 VITALS — BODY MASS INDEX: 32.76 KG/M2 | HEIGHT: 71 IN | WEIGHT: 234 LBS

## 2023-11-09 DIAGNOSIS — R22.31 LOCALIZED SWELLING, MASS AND LUMP, RIGHT UPPER LIMB: ICD-10-CM

## 2023-11-09 PROCEDURE — 99024 POSTOP FOLLOW-UP VISIT: CPT

## 2023-11-29 RX ORDER — OXYCODONE AND ACETAMINOPHEN 5; 325 MG/1; MG/1
5-325 TABLET ORAL
Qty: 20 | Refills: 0 | Status: ACTIVE | COMMUNITY
Start: 2023-10-17 | End: 1900-01-01

## 2024-01-03 ENCOUNTER — APPOINTMENT (OUTPATIENT)
Dept: ORTHOPEDIC SURGERY | Facility: AMBULATORY SURGERY CENTER | Age: 69
End: 2024-01-03

## 2024-01-09 ENCOUNTER — APPOINTMENT (OUTPATIENT)
Dept: ORTHOPEDIC SURGERY | Facility: CLINIC | Age: 69
End: 2024-01-09

## 2024-03-22 NOTE — ED ADULT NURSE NOTE - EENT WDL
Plan:  Holding home MS Contin 30 mg BID and PRN Percocet 5-325 mg Q8H - appears to be filling this regularly per PDMP  Holding home Neurontin while NPO  Continue Fentanyl gtt while intubated   Eyes with no visual disturbances.  Ears clean and dry and no hearing difficulties. Nose with pink mucosa and no drainage.  Mouth mucous membranes moist and pink.  No tenderness or swelling to throat or neck.